# Patient Record
Sex: FEMALE | Race: BLACK OR AFRICAN AMERICAN | Employment: OTHER | URBAN - NONMETROPOLITAN AREA
[De-identification: names, ages, dates, MRNs, and addresses within clinical notes are randomized per-mention and may not be internally consistent; named-entity substitution may affect disease eponyms.]

---

## 2017-07-24 LAB
ANION GAP SERPL CALCULATED.3IONS-SCNC: 6 MMOL/L (ref 4–12)
BUN BLDV-MCNC: 29 MG/DL (ref 7–20)
CALCIUM SERPL-MCNC: 9.5 MG/DL (ref 8.8–10.5)
CHLORIDE BLD-SCNC: 103 MEQ/L (ref 101–111)
CO2: 25 MEQ/L (ref 21–32)
CREAT SERPL-MCNC: 1.47 MG/DL (ref 0.6–1.3)
CREATININE CLEARANCE: 42
GLUCOSE: 247 MG/DL (ref 70–110)
PARATHYROID HORMONE INTACT: 36.1 U/ML (ref 12–88)
PHOSPHORUS: 2.9 MG/DL (ref 2.4–4.7)
POTASSIUM SERPL-SCNC: 5.1 MEQ/L (ref 3.6–5)
SODIUM BLD-SCNC: 134 MEQ/L (ref 135–145)
VITAMIN D 25-HYDROXY: 29.38 NG/ML (ref 30–100)

## 2017-07-25 ENCOUNTER — TELEPHONE (OUTPATIENT)
Dept: NEPHROLOGY | Age: 72
End: 2017-07-25

## 2017-07-25 DIAGNOSIS — E87.5 HYPERKALEMIA: Primary | ICD-10-CM

## 2017-07-31 LAB — POTASSIUM SERPL-SCNC: 4.8 MEQ/L (ref 3.6–5)

## 2017-08-01 ENCOUNTER — OFFICE VISIT (OUTPATIENT)
Dept: NEPHROLOGY | Age: 72
End: 2017-08-01
Payer: MEDICARE

## 2017-08-01 ENCOUNTER — TELEPHONE (OUTPATIENT)
Dept: NEPHROLOGY | Age: 72
End: 2017-08-01

## 2017-08-01 VITALS
RESPIRATION RATE: 18 BRPM | BODY MASS INDEX: 23.57 KG/M2 | DIASTOLIC BLOOD PRESSURE: 62 MMHG | SYSTOLIC BLOOD PRESSURE: 170 MMHG | HEART RATE: 68 BPM | WEIGHT: 146 LBS

## 2017-08-01 DIAGNOSIS — E11.22 TYPE 2 DIABETES MELLITUS WITH STAGE 3 CHRONIC KIDNEY DISEASE, UNSPECIFIED LONG TERM INSULIN USE STATUS: ICD-10-CM

## 2017-08-01 DIAGNOSIS — I10 ESSENTIAL HYPERTENSION: ICD-10-CM

## 2017-08-01 DIAGNOSIS — N18.3 TYPE 2 DIABETES MELLITUS WITH STAGE 3 CHRONIC KIDNEY DISEASE, UNSPECIFIED LONG TERM INSULIN USE STATUS: ICD-10-CM

## 2017-08-01 DIAGNOSIS — E55.9 VITAMIN D DEFICIENCY: ICD-10-CM

## 2017-08-01 DIAGNOSIS — N18.30 CKD (CHRONIC KIDNEY DISEASE), STAGE III (HCC): Primary | ICD-10-CM

## 2017-08-01 PROCEDURE — 99213 OFFICE O/P EST LOW 20 MIN: CPT | Performed by: INTERNAL MEDICINE

## 2017-08-01 RX ORDER — MULTIVIT-MIN/IRON/FOLIC ACID/K 18-600-40
2000 CAPSULE ORAL DAILY
COMMUNITY
Start: 2017-08-01 | End: 2020-08-04 | Stop reason: ALTCHOICE

## 2018-07-25 LAB
ANION GAP SERPL CALCULATED.3IONS-SCNC: 6 MMOL/L (ref 4–12)
BUN BLDV-MCNC: 36 MG/DL (ref 7–20)
CALCIUM SERPL-MCNC: 10 MG/DL (ref 8.8–10.5)
CHLORIDE BLD-SCNC: 103 MEQ/L (ref 101–111)
CO2: 26 MEQ/L (ref 21–32)
CREAT SERPL-MCNC: 1.75 MG/DL (ref 0.6–1.3)
CREATININE CLEARANCE: 35
GLUCOSE: 282 MG/DL (ref 70–110)
PARATHYROID HORMONE INTACT: 29.4 U/ML (ref 12–88)
PHOSPHORUS: 3.4 MG/DL (ref 2.4–4.7)
POTASSIUM SERPL-SCNC: 5 MEQ/L (ref 3.6–5)
SODIUM BLD-SCNC: 135 MEQ/L (ref 135–145)

## 2018-07-31 LAB — VITAMIN D 1,25-DIHYDROXY: 44 PG/ML (ref 18–78)

## 2018-08-07 ENCOUNTER — OFFICE VISIT (OUTPATIENT)
Dept: NEPHROLOGY | Age: 73
End: 2018-08-07
Payer: MEDICARE

## 2018-08-07 VITALS
RESPIRATION RATE: 18 BRPM | HEART RATE: 80 BPM | DIASTOLIC BLOOD PRESSURE: 48 MMHG | BODY MASS INDEX: 24.21 KG/M2 | SYSTOLIC BLOOD PRESSURE: 130 MMHG | WEIGHT: 150 LBS

## 2018-08-07 DIAGNOSIS — N18.3 TYPE 2 DIABETES MELLITUS WITH STAGE 3 CHRONIC KIDNEY DISEASE, UNSPECIFIED WHETHER LONG TERM INSULIN USE: ICD-10-CM

## 2018-08-07 DIAGNOSIS — N18.30 CKD (CHRONIC KIDNEY DISEASE), STAGE III (HCC): Primary | ICD-10-CM

## 2018-08-07 DIAGNOSIS — E11.22 TYPE 2 DIABETES MELLITUS WITH STAGE 3 CHRONIC KIDNEY DISEASE, UNSPECIFIED WHETHER LONG TERM INSULIN USE: ICD-10-CM

## 2018-08-07 DIAGNOSIS — I10 ESSENTIAL HYPERTENSION: ICD-10-CM

## 2018-08-07 PROCEDURE — 99213 OFFICE O/P EST LOW 20 MIN: CPT | Performed by: INTERNAL MEDICINE

## 2018-08-07 RX ORDER — ATORVASTATIN CALCIUM 40 MG/1
40 TABLET, FILM COATED ORAL DAILY
COMMUNITY
End: 2022-06-16 | Stop reason: ALTCHOICE

## 2018-08-07 NOTE — PROGRESS NOTES
Renal Progress Note    Assessment and Plan:      Diagnosis Orders   1. CKD (chronic kidney disease), stage III     2. Essential hypertension     3. Type 2 diabetes mellitus with stage 3 chronic kidney disease, unspecified whether long term insulin use ([facility]       PLAN:  Labs reviewed with the patient and she understood  Serum creatinine is mostly stable at 1.75 mg/dL  Medications reviewed  No changes  Return visit in 12 months with labs          Patient Active Problem List   Diagnosis    Acute renal failure (HCC)    CKD (chronic kidney disease), stage III    HTN (hypertension)    Hypothyroidism    HLD (hyperlipidemia)    Anemia    DM type 2 (diabetes mellitus, type 2) (HCC)    Vitamin D deficiency       Subjective:   Chief complaint:  Chief Complaint   Patient presents with    1 Year Follow Up    Chronic Kidney Disease     Stage 3      HPI:This is a follow up visit for Mrs. Neema Lubin who is here today for return appointment. I see her for chronic kidney disease. She was last seen about 12 months ago. Serum creatinine was 1.47 mg/dL. Today it is 1.75 mg/dl but still within her baseline. Since last time I saw her, she had changes in her medication. Tradjenta dose was increased to 5 mg from 2.5 mg a day. Doing well with no complaints.     ROS:Constitutional: negative  Eyes: negative  Ears, nose, mouth, throat, and face: negative  Respiratory: negative  Cardiovascular: negative  Gastrointestinal: negative  Genitourinary:negative  Integument/breast: negative  Hematologic/lymphatic: negative  Musculoskeletal:positive for arthralgias and stiff joints  Neurological: negative  Behavioral/Psych: negative  Endocrine: negative  Allergic/Immunologic: negative  Medications:     Current Outpatient Prescriptions   Medication Sig Dispense Refill    atorvastatin (LIPITOR) 40 MG tablet Take 40 mg by mouth daily      Cholecalciferol (VITAMIN D) 2000 units CAPS capsule Take 2,000 Units by mouth daily      linagliptin (TRADJENTA) 5 MG tablet Take 5 mg by mouth daily      Multiple Vitamins-Minerals (CENTRUM SILVER) TABS Take  by mouth daily.  glimepiride (AMARYL) 4 MG tablet Take 4 mg by mouth every morning (before breakfast)       Levothyroxine Sodium 100 MCG CAPS Take 88 mcg by mouth Daily       losartan (COZAAR) 100 MG tablet Take 100 mg by mouth daily.  aspirin 81 MG tablet Take 81 mg by mouth daily.  pravastatin (PRAVACHOL) 40 MG tablet Take 40 mg by mouth daily.  amLODIPine (NORVASC) 5 MG tablet Take 5 mg by mouth daily. No current facility-administered medications for this visit.         Lab Results:    CBC:   Lab Results   Component Value Date    WBC 3.5 (L) 06/25/2015    HGB 11.2 (L) 06/25/2015    HCT 35.2 06/25/2015    MCV 84.58 06/25/2015     06/25/2015     BMP:    Lab Results   Component Value Date     07/25/2018     07/25/2018     (L) 07/24/2017    K 5.0 07/25/2018    K 5.0 07/25/2018    K 4.8 07/31/2017     07/25/2018     07/25/2018     07/24/2017    CO2 26 07/25/2018    CO2 26 07/25/2018    CO2 25 07/24/2017    BUN 36 (H) 07/25/2018    BUN 36 (H) 07/25/2018    BUN 29 (H) 07/24/2017    CREATININE 1.75 (H) 07/25/2018    CREATININE 1.75 (H) 07/25/2018    CREATININE 1.47 (H) 07/24/2017    GLUCOSE 282 (H) 07/25/2018    GLUCOSE 282 (H) 07/25/2018    GLUCOSE 247 (H) 07/24/2017      Hepatic:   Lab Results   Component Value Date    AST 37 07/25/2018    AST 30 06/25/2015    ALT 24 07/25/2018    ALT 18 06/25/2015    BILITOT 0.8 07/25/2018    BILITOT 0.6 06/25/2015    ALKPHOS 74 07/25/2018    ALKPHOS 64 06/25/2015     BNP: No results found for: BNP  Lipids:   Lab Results   Component Value Date    CHOL 233 (H) 07/25/2018    HDL 84 07/25/2018     INR: No results found for: INR  URINE: No results found for: NAUR, PROTUR  No results found for: NITRU, COLORU, PHUR, LABCAST, WBCUA, RBCUA, MUCUS, TRICHOMONAS, YEAST, BACTERIA, CLARITYU, SPECGRAV, LEUKOCYTESUR, UROBILINOGEN, BILIRUBINUR, BLOODU, GLUCOSEU, KETUA, AMORPHOUS   Microalbumen/Creatinine ratio:  No components found for: RUCREAT    Objective:   Vitals: BP (!) 130/48   Pulse 80   Resp 18   Wt 150 lb (68 kg)   BMI 24.21 kg/m²      Constitutional:  Alert, awake, no apparent distress  Skin:normal  HEENT:Pupils are reactive . Throat is clear  Neck:supple with no thyromegally  Cardiovascular:  S1, S2 without murmur  Respiratory:  Clear  Abdomen: +bs, soft, non tender  Ext: No LE edema  Musculoskeletal:Intact  Neuro:Alert and oriented with no deficit    Electronically signed by Jaimie Cardoso MD on 8/7/2018 at 10:11 AM

## 2018-09-28 PROBLEM — N18.30 TYPE 2 DIABETES MELLITUS WITH STAGE 3 CHRONIC KIDNEY DISEASE (HCC): Status: ACTIVE | Noted: 2018-09-28

## 2018-09-28 PROBLEM — E11.22 TYPE 2 DIABETES MELLITUS WITH STAGE 3 CHRONIC KIDNEY DISEASE (HCC): Status: ACTIVE | Noted: 2018-09-28

## 2019-07-26 LAB
ANION GAP SERPL CALCULATED.3IONS-SCNC: 6 MMOL/L (ref 4–12)
BUN BLDV-MCNC: 38 MG/DL (ref 7–20)
CALCIUM SERPL-MCNC: 9.9 MG/DL (ref 8.8–10.5)
CHLORIDE BLD-SCNC: 102 MEQ/L (ref 101–111)
CO2: 27 MEQ/L (ref 21–32)
CREAT SERPL-MCNC: 2.1 MG/DL (ref 0.6–1.3)
CREATININE CLEARANCE: 28
GLUCOSE: 263 MG/DL (ref 70–110)
PARATHYROID HORMONE INTACT: 39 U/ML (ref 12–88)
PHOSPHORUS: 3.4 MG/DL (ref 2.4–4.7)
POTASSIUM SERPL-SCNC: 5.2 MEQ/L (ref 3.6–5)
SODIUM BLD-SCNC: 135 MEQ/L (ref 135–145)
VITAMIN D 25-HYDROXY: 26.3 NG/ML (ref 30–100)

## 2019-07-29 ENCOUNTER — TELEPHONE (OUTPATIENT)
Dept: NEPHROLOGY | Age: 74
End: 2019-07-29

## 2019-07-29 NOTE — TELEPHONE ENCOUNTER
----- Message from Renata Doran MD sent at 7/29/2019  5:34 AM EDT -----  Serum potassium is slightly high.   Low potassium diet   Repeat potassium in 3 days

## 2019-08-06 ENCOUNTER — OFFICE VISIT (OUTPATIENT)
Dept: NEPHROLOGY | Age: 74
End: 2019-08-06
Payer: MEDICARE

## 2019-08-06 VITALS
DIASTOLIC BLOOD PRESSURE: 69 MMHG | SYSTOLIC BLOOD PRESSURE: 161 MMHG | OXYGEN SATURATION: 96 % | HEART RATE: 60 BPM | BODY MASS INDEX: 24.02 KG/M2 | WEIGHT: 148.8 LBS

## 2019-08-06 DIAGNOSIS — E11.22 TYPE 2 DIABETES MELLITUS WITH STAGE 3 CHRONIC KIDNEY DISEASE, UNSPECIFIED WHETHER LONG TERM INSULIN USE: ICD-10-CM

## 2019-08-06 DIAGNOSIS — E55.9 VITAMIN D DEFICIENCY: ICD-10-CM

## 2019-08-06 DIAGNOSIS — N18.30 CKD (CHRONIC KIDNEY DISEASE), STAGE III (HCC): Primary | ICD-10-CM

## 2019-08-06 DIAGNOSIS — E87.5 HYPERKALEMIA: ICD-10-CM

## 2019-08-06 DIAGNOSIS — I10 ESSENTIAL HYPERTENSION: ICD-10-CM

## 2019-08-06 DIAGNOSIS — N18.3 TYPE 2 DIABETES MELLITUS WITH STAGE 3 CHRONIC KIDNEY DISEASE, UNSPECIFIED WHETHER LONG TERM INSULIN USE: ICD-10-CM

## 2019-08-06 PROCEDURE — 99214 OFFICE O/P EST MOD 30 MIN: CPT | Performed by: INTERNAL MEDICINE

## 2019-08-06 RX ORDER — PIOGLITAZONEHYDROCHLORIDE 15 MG/1
15 TABLET ORAL DAILY
COMMUNITY
End: 2021-08-17

## 2019-08-06 NOTE — PATIENT INSTRUCTIONS
Plums, dried, pitted five 350   Artichokes, cooked one medium 343   Mashed potatoes ½ C 343   Edamame/soybeans, green ½ C 338   Tomato, canned  ½ C 325   Raisins ¼ C 299   Tomato, raw one medium 292   Papaya one small 286   Peach one medium 285   Pistachios, dry roasted, salted  1 oz (47 nuts) 285   Pumpkin, cooked, mashed ½ C 282   French fries 10 fries 278   Mushrooms, white, cooked ½ C 278   Beets, cooked ½ C 259   [place] sprouts, cooked ½ C 247   Kiwi one medium 237   Orange, raw one medium 237   Green peas, cooked ½ C 217   Cantaloupe, raw ½ C 214   Pear, raw one medium 212   Almonds, dry roasted 1 oz (24 nuts) 202   Apricot, canned, juice pack ½ C 202   Asparagus, cooked ½ C 202   Swanzey squash, cooked ½ C 201   Apple, raw with skin one medium 195   Honeydew ½ C 194   Carrots, cooked ½ C 183   Onions, cooked ½ C 175   Spinach, raw 1 C 167   Corn, sweet yellow ½ C 163   Red guthrie pepper ½ C 157   Kale, cooked ½ C 150   Cabbage, cooked ½ C 147   Mustard greens, cooked ½ C 142   [place] ½ C 139   Figs, dried two figs 134   Summer squash, cooked ½ C 126   Grapes 10 grapes 120   Okra, cooked ½ C 108   Bamboo shoots, canned 1 C 108   Celery, raw one stalk 105   Peanut butter, creamy 1 Tbsp 104   Green beans, cooked ½ C 104   Cauliflower, cooked ½ C 91   Mushrooms, shitake, cooked ½ C 88   Watermelon ½ C 85   Cucumber, peeled ½ C 88   Iceberg lettuce 1 C 81   Tomato, sun dried one piece 80   Eggplant, cooked ½ C 69   Pickle one pickle 61   Ketchup 1 Tbsp 60   Radishes one radish 57     5   C=cup, mg=milligrams, oz=ounces, Tbsp=tablespoon    Reference  [place] Dept of Agriculture, Agricultural Research Service, [employer] Database for Standard Reference, Release 25: Potassium, K (mg) content of selected foods per common measure  Please take vitamin D3 over-the-counter 2000 international units 1 capsule a day for low vitamin D level.

## 2019-08-16 LAB
BUN BLDV-MCNC: 67 MG/DL
CALCIUM SERPL-MCNC: 9.9 MG/DL
CHLORIDE BLD-SCNC: 103 MMOL/L
CO2: 22 MMOL/L
CREAT SERPL-MCNC: 2.41 MG/DL
GFR CALCULATED: 24
GLUCOSE BLD-MCNC: 90 MG/DL
POTASSIUM SERPL-SCNC: 5.5 MMOL/L
POTASSIUM SERPL-SCNC: 6.1 MEQ/L (ref 3.6–5)
SODIUM BLD-SCNC: 133 MMOL/L

## 2019-08-19 ENCOUNTER — TELEPHONE (OUTPATIENT)
Dept: NEPHROLOGY | Age: 74
End: 2019-08-19

## 2019-08-19 DIAGNOSIS — E87.5 HYPERKALEMIA: Primary | ICD-10-CM

## 2019-08-23 ENCOUNTER — TELEPHONE (OUTPATIENT)
Dept: NEPHROLOGY | Age: 74
End: 2019-08-23

## 2019-08-23 LAB — POTASSIUM SERPL-SCNC: 5 MEQ/L (ref 3.6–5)

## 2020-01-28 LAB
ANION GAP SERPL CALCULATED.3IONS-SCNC: 6 MMOL/L (ref 4–12)
BUN BLDV-MCNC: 50 MG/DL (ref 7–20)
CALCIUM SERPL-MCNC: 9.5 MG/DL (ref 8.8–10.5)
CHLORIDE BLD-SCNC: 107 MEQ/L (ref 101–111)
CO2: 22 MEQ/L (ref 21–32)
CREAT SERPL-MCNC: 2.25 MG/DL (ref 0.6–1.3)
CREATININE CLEARANCE: 26
CREATININE, RANDOM URINE: 92.6 MG/DL
GLUCOSE: 174 MG/DL (ref 70–110)
PARATHYROID HORMONE INTACT: 30.4 U/ML (ref 12–88)
PHOSPHORUS: 3.4 MG/DL (ref 2.4–4.7)
POTASSIUM SERPL-SCNC: 4.6 MEQ/L (ref 3.6–5)
PROTEIN, URINE, RANDOM: 71.2 MG/DL
PROTEIN/CREAT RATIO: 0.8 G/1.73M2
SODIUM BLD-SCNC: 135 MEQ/L (ref 135–145)
VITAMIN D 25-HYDROXY: 33.3 NG/ML (ref 30–100)

## 2020-02-04 ENCOUNTER — OFFICE VISIT (OUTPATIENT)
Dept: NEPHROLOGY | Age: 75
End: 2020-02-04
Payer: MEDICARE

## 2020-02-04 VITALS
OXYGEN SATURATION: 99 % | BODY MASS INDEX: 25.11 KG/M2 | WEIGHT: 155.6 LBS | SYSTOLIC BLOOD PRESSURE: 160 MMHG | HEART RATE: 77 BPM | DIASTOLIC BLOOD PRESSURE: 74 MMHG

## 2020-02-04 PROCEDURE — 99213 OFFICE O/P EST LOW 20 MIN: CPT | Performed by: INTERNAL MEDICINE

## 2020-02-04 RX ORDER — LEVOTHYROXINE SODIUM 0.12 MG/1
112 TABLET ORAL DAILY
COMMUNITY
End: 2022-05-19

## 2020-02-04 NOTE — PATIENT INSTRUCTIONS
Please check your blood pressure twice a day mornings and evenings for at least 5 to 7 days before the next appointment and bring the record.

## 2020-02-04 NOTE — PROGRESS NOTES
Results   Component Value Date    AST 37 07/25/2018    AST 30 06/25/2015    ALT 24 07/25/2018    ALT 18 06/25/2015    BILITOT 0.8 07/25/2018    BILITOT 0.6 06/25/2015    ALKPHOS 74 07/25/2018    ALKPHOS 64 06/25/2015     BNP: No results found for: BNP  Lipids:   Lab Results   Component Value Date    CHOL 233 (H) 07/25/2018    HDL 84 07/25/2018     INR: No results found for: INR  URINE: No results found for: NAUR, PROTUR  No results found for: NITRU, COLORU, PHUR, LABCAST, WBCUA, RBCUA, MUCUS, TRICHOMONAS, YEAST, BACTERIA, CLARITYU, SPECGRAV, LEUKOCYTESUR, UROBILINOGEN, BILIRUBINUR, BLOODU, GLUCOSEU, KETUA, AMORPHOUS   Microalbumen/Creatinine ratio:  No components found for: RUCREAT    Objective:   Vitals: BP (!) 160/74 (Site: Right Upper Arm, Position: Sitting, Cuff Size: Medium Adult)   Pulse 77   Wt 155 lb 9.6 oz (70.6 kg)   SpO2 99%   BMI 25.11 kg/m²      Constitutional:  Alert, awake, no apparent distress  Skin:normal with no rash or any lesions  HEENT:Pupils are reactive . Throat is clear. Oral mucosa is moist.  Neck:supple with no thyromegaly or bruit   Cardiovascular:  S1, S2 without murmur   Respiratory:  Clear to auscultation with no wheezes or rales  Abdomen: +bowel sound, soft, non tender and no bruit  Ext: No.  LE edema  Musculoskeletal:Intact  Neuro:Alert, awake and oriented with no obvious focal deficit.   Speech is normal.    Electronically signed by Dallis Lesches, MD on 2/4/2020 at 9:03 AM

## 2020-07-29 LAB
ANION GAP SERPL CALCULATED.3IONS-SCNC: 5 MMOL/L (ref 4–12)
BUN BLDV-MCNC: 57 MG/DL (ref 7–20)
CALCIUM SERPL-MCNC: 9.7 MG/DL (ref 8.8–10.5)
CHLORIDE BLD-SCNC: 108 MEQ/L (ref 101–111)
CO2: 21 MEQ/L (ref 21–32)
CREAT SERPL-MCNC: 2.33 MG/DL (ref 0.6–1.3)
CREATININE CLEARANCE: 25
CREATININE, RANDOM URINE: 36 MG/DL
GLUCOSE: 183 MG/DL (ref 70–110)
PARATHYROID HORMONE INTACT: 41.6 U/ML (ref 12–88)
POTASSIUM SERPL-SCNC: 4.2 MEQ/L (ref 3.6–5)
PROTEIN, URINE, RANDOM: 35.3 MG/DL
PROTEIN/CREAT RATIO: 1 G/1.73M2
SODIUM BLD-SCNC: 134 MEQ/L (ref 135–145)
VITAMIN D 25-HYDROXY: 30.8 NG/ML (ref 30–100)

## 2020-08-04 ENCOUNTER — OFFICE VISIT (OUTPATIENT)
Dept: NEPHROLOGY | Age: 75
End: 2020-08-04
Payer: MEDICARE

## 2020-08-04 VITALS
DIASTOLIC BLOOD PRESSURE: 62 MMHG | WEIGHT: 156 LBS | TEMPERATURE: 97.4 F | BODY MASS INDEX: 25.07 KG/M2 | OXYGEN SATURATION: 100 % | HEART RATE: 73 BPM | SYSTOLIC BLOOD PRESSURE: 164 MMHG | HEIGHT: 66 IN

## 2020-08-04 PROCEDURE — 99213 OFFICE O/P EST LOW 20 MIN: CPT | Performed by: INTERNAL MEDICINE

## 2020-08-04 NOTE — PROGRESS NOTES
Renal Progress Note    Assessment and Plan:      Diagnosis Orders   1. CKD (chronic kidney disease), stage III ([facility]     2. Type 2 diabetes mellitus with stage 3 chronic kidney disease, unspecified whether long term insulin use ([facility]     3. Essential hypertension     4. Proteinuria, unspecified type       PLAN:  Lab result discussed with the patient. She understood. Serum creatinine is very slightly increased to 2.3 mg/dL from 2.2 mg/dL 6 months ago. Medications reviewed. No changes. Continue to avoid nonsteroidal anti-inflammatory drugs. Return visit in 6 months with labs. Patient Active Problem List   Diagnosis    Acute renal failure (HCC)    CKD (chronic kidney disease), stage III ([facility]    HTN (hypertension)    Hypothyroidism    HLD (hyperlipidemia)    Anemia    DM type 2 (diabetes mellitus, type 2) (HCC)    Vitamin D deficiency    Type 2 diabetes mellitus with stage 3 chronic kidney disease ([facility]       Subjective:   Chief complaint:  Chief Complaint   Patient presents with    Chronic Kidney Disease      HPI:This is a follow up visit for Mrs. Thom Bradley who is here today for repeat appointment. I see her for chronic kidney disease. She was last seen about 6 months ago. Serum creatinine was 2.25 mg/L. Today it is 2.32 g/dL. Doing well with no complaint. She brought her home blood pressure record. They are very good. Her Tradjenta was discontinued and replaced with onglyyza . Joelle Vasquez Denies chest pain,shortness of breath,fever ,chils ,nausea and vomiting     ROS:Constitutional: negative  Eyes: negative  Ears, nose, mouth, throat, and face: negative  Respiratory: negative  Cardiovascular: negative  Gastrointestinal: negative  Genitourinary:negative  Integument/breast: negative  Hematologic/lymphatic: negative  Musculoskeletal:negative  Neurological: negative  Behavioral/Psych: negative  Endocrine: negative  Allergic/Immunologic: negative     Medications:     Current Outpatient Medications Medication Sig Dispense Refill    saxagliptin (ONGLYZA) 2.5 MG TABS tablet Take 2.5 mg by mouth daily      levothyroxine (SYNTHROID) 125 MCG tablet Take 125 mcg by mouth Daily      pioglitazone (ACTOS) 15 MG tablet Take 15 mg by mouth daily      atorvastatin (LIPITOR) 40 MG tablet Take 40 mg by mouth daily      Multiple Vitamins-Minerals (CENTRUM SILVER) TABS Take  by mouth daily.  glimepiride (AMARYL) 4 MG tablet Take 4 mg by mouth every morning (before breakfast)       losartan (COZAAR) 100 MG tablet Take 100 mg by mouth daily.  aspirin 81 MG tablet Take 81 mg by mouth daily.  amLODIPine (NORVASC) 5 MG tablet Take 5 mg by mouth daily. No current facility-administered medications for this visit.         Lab Results:    CBC:   Lab Results   Component Value Date    WBC 3.5 (L) 06/25/2015    HGB 11.2 (L) 06/25/2015    HCT 35.2 06/25/2015    MCV 84.58 06/25/2015     06/25/2015     BMP:    Lab Results   Component Value Date     (L) 07/29/2020     01/28/2020     08/16/2019    K 4.2 07/29/2020    K 4.6 01/28/2020    K 5.0 08/23/2019     07/29/2020     01/28/2020     08/16/2019    CO2 21 07/29/2020    CO2 22 01/28/2020    CO2 22 08/16/2019    BUN 57 (H) 07/29/2020    BUN 50 (H) 01/28/2020    BUN 67 08/16/2019    CREATININE 2.33 (H) 07/29/2020    CREATININE 2.25 (H) 01/28/2020    CREATININE 2.41 08/16/2019    GLUCOSE 183 (H) 07/29/2020    GLUCOSE 174 (H) 01/28/2020    GLUCOSE 90 08/16/2019      Hepatic:   Lab Results   Component Value Date    AST 37 07/25/2018    AST 30 06/25/2015    ALT 24 07/25/2018    ALT 18 06/25/2015    BILITOT 0.8 07/25/2018    BILITOT 0.6 06/25/2015    ALKPHOS 74 07/25/2018    ALKPHOS 64 06/25/2015     BNP: No results found for: BNP  Lipids:   Lab Results   Component Value Date    CHOL 233 (H) 07/25/2018    HDL 84 07/25/2018     INR: No results found for: INR  URINE: No results found for: NAUR, PROTUR  No results found for: NITRU,

## 2021-02-03 LAB
ANION GAP SERPL CALCULATED.3IONS-SCNC: 5 MMOL/L (ref 4–12)
BUN BLDV-MCNC: 47 MG/DL (ref 7–20)
CALCIUM SERPL-MCNC: 9.6 MG/DL (ref 8.8–10.5)
CHLORIDE BLD-SCNC: 101 MEQ/L (ref 101–111)
CO2: 26 MEQ/L (ref 21–32)
CREAT SERPL-MCNC: 2.02 MG/DL (ref 0.6–1.3)
CREATININE CLEARANCE: 29
CREATININE, RANDOM URINE: 74.2 MG/DL
GLUCOSE: 237 MG/DL (ref 70–110)
PARATHYROID HORMONE INTACT: 40.5 U/ML (ref 12–88)
POTASSIUM SERPL-SCNC: 4.6 MEQ/L (ref 3.6–5)
PROTEIN, URINE, RANDOM: 90.7 MG/DL
PROTEIN/CREAT RATIO: 1.2 G/1.73M2
SODIUM BLD-SCNC: 132 MEQ/L (ref 135–145)
VITAMIN D 25-HYDROXY: 30.4 NG/ML (ref 30–100)

## 2021-02-16 ENCOUNTER — VIRTUAL VISIT (OUTPATIENT)
Dept: NEPHROLOGY | Age: 76
End: 2021-02-16
Payer: MEDICARE

## 2021-02-16 DIAGNOSIS — R80.9 PROTEINURIA, UNSPECIFIED TYPE: ICD-10-CM

## 2021-02-16 DIAGNOSIS — I10 ESSENTIAL HYPERTENSION: ICD-10-CM

## 2021-02-16 DIAGNOSIS — N18.32 STAGE 3B CHRONIC KIDNEY DISEASE (HCC): Primary | ICD-10-CM

## 2021-02-16 DIAGNOSIS — E55.9 VITAMIN D DEFICIENCY: ICD-10-CM

## 2021-02-16 DIAGNOSIS — E87.1 HYPONATREMIA: ICD-10-CM

## 2021-02-16 PROCEDURE — 99213 OFFICE O/P EST LOW 20 MIN: CPT | Performed by: INTERNAL MEDICINE

## 2021-02-16 NOTE — PROGRESS NOTES
2021    TELEHEALTH EVALUATION -- Audio/Visual (During ESHBQ-22 public health emergency)  This is a telehealth video visit for Mrs. Devan Loera. I see her for chronic kidney disease. She was last seen about 6 months ago. Doing well with no complaint. She does not measure her blood pressure routinely at home but only very occasionally. When she draws blood pressure tends to be good. No chest pain or shortness of breath. No fever or chills. Appetite is good. No nausea vomiting. No headaches. She has chronic joint ache and pain modified by analgesics. No new medications since last time I saw her in the office  Place of visit for patient is her home  Place of visit for physician is office  Reason for visit is follow-up for chronic kidney disease  HPI: As above    Devan Loera (:  1945) has requested an audio/video evaluation for the following concern(s):    Follow-up for chronic kidney disease    Review of Systems as in history of present illness. Other systems are negative. Prior to Visit Medications    Medication Sig Taking? Authorizing Provider   saxagliptin (ONGLYZA) 2.5 MG TABS tablet Take 2.5 mg by mouth daily  Historical Provider, MD   levothyroxine (SYNTHROID) 125 MCG tablet Take 125 mcg by mouth Daily  Historical Provider, MD   pioglitazone (ACTOS) 15 MG tablet Take 15 mg by mouth daily  Historical Provider, MD   atorvastatin (LIPITOR) 40 MG tablet Take 40 mg by mouth daily  Historical Provider, MD   Multiple Vitamins-Minerals (CENTRUM SILVER) TABS Take  by mouth daily. Historical Provider, MD   glimepiride (AMARYL) 4 MG tablet Take 4 mg by mouth every morning (before breakfast)   Historical Provider, MD   losartan (COZAAR) 100 MG tablet Take 100 mg by mouth daily. Historical Provider, MD   aspirin 81 MG tablet Take 81 mg by mouth daily. Historical Provider, MD   amLODIPine (NORVASC) 5 MG tablet Take 5 mg by mouth daily.   Historical Provider, MD       Social History Tobacco Use    Smoking status: Never Smoker    Smokeless tobacco: Never Used   Substance Use Topics    Alcohol use: No    Drug use: Not on file            PHYSICAL EXAMINATION:  [ INSTRUCTIONS:  \"[x]\" Indicates a positive item  \"[]\" Indicates a negative item  -- DELETE ALL ITEMS NOT EXAMINED]  Vital Signs: (As obtained by patient/caregiver or practitioner observation)    Blood pressure-  Heart rate-    Respiratory rate-    Temperature-  Pulse oximetry-     Constitutional: [x] Appears well-developed and well-nourished [x] No apparent distress      [] Abnormal-   Mental status  [x] Alert and awake  [x] Oriented to person/place/time [x]Able to follow commands      Eyes:  EOM    [x]  Normal  [] Abnormal-  Sclera  [x]  Normal  [] Abnormal -         Discharge [x]  None visible  [] Abnormal -    HENT:   [x] Normocephalic, atraumatic. [] Abnormal   [x] Mouth/Throat: Mucous membranes are moist.     External Ears [x] Normal  [] Abnormal-     Neck: [x] No visualized mass     Pulmonary/Chest: [x] Respiratory effort normal.  [x] No visualized signs of difficulty breathing or respiratory distress        [] Abnormal-      Musculoskeletal:   [] Normal gait with no signs of ataxia         [] Normal range of motion of neck        [] Abnormal-       Neurological:        [x] No Facial Asymmetry (Cranial nerve 7 motor function) (limited exam to video visit)          [x] No gaze palsy        [] Abnormal-         Skin:        [x] No significant exanthematous lesions or discoloration noted on facial skin         [] Abnormal-            Psychiatric:       [x] Normal Affect [x] No Hallucinations        [] Abnormal-     Other pertinent observable physical exam findings-     ASSESSMENT/PLAN:   Diagnosis Orders   1. Stage 3b chronic kidney disease     2. Essential hypertension     3. Proteinuria, unspecified type     4. Vitamin D deficiency     5. Hyponatremia        PLAN:  Lab result discussed with the patient. She understood. I addressed her questions. Serum creatinine is improved to 2.0 mg/dL from 2.3 mg/dL 6 months ago. PTH is normal.  Vitamin D level is on the low end of normal.  Random urine protein creatinine ratio is slightly increased to 1200 mg from 1000 mg 6 months ago. Low protein diet. Medications reviewed. No changes. Return visit in 6 months with labs. No follow-ups on file. Casimiro Chandler is a 77 y.o. female being evaluated by a Virtual Visit (video visit) encounter to address concerns as mentioned above. A caregiver was present when appropriate. Due to this being a TeleHealth encounter (During YLNSI-72 public [facility] emergency), evaluation of the following organ systems was limited: Vitals/Constitutional/EENT/Resp/CV/GI//MS/Neuro/Skin/Heme-Lymph-Imm. Pursuant to the emergency declaration under the [address] [place] [address], [address] [place] [address] authority and the [employer] Act, this Virtual Visit was conducted with patient's (and/or legal guardian's) consent, to reduce the patient's risk of exposure to COVID-19 and provide necessary medical care. The patient (and/or legal guardian) has also been advised to contact this office for worsening conditions or problems, and seek emergency medical treatment and/or call 911 if deemed necessary. Patient identification was verified at the start of the visit: Yes    Total time spent on this encounter: About 14 minutes including documentation time and chart review    Services were provided through a video synchronous discussion virtually to substitute for in-person clinic visit. Patient and provider were located at their individual homes. --Ortiz Toscano MD on 2/16/2021 at 8:31 AM    An electronic signature was used to authenticate this note.

## 2021-07-30 LAB
ANION GAP SERPL CALCULATED.3IONS-SCNC: 5 MMOL/L (ref 4–12)
BUN BLDV-MCNC: 45 MG/DL (ref 7–20)
CALCIUM SERPL-MCNC: 9.1 MG/DL (ref 8.8–10.5)
CHLORIDE BLD-SCNC: 108 MEQ/L (ref 101–111)
CO2: 20 MEQ/L (ref 21–32)
CREAT SERPL-MCNC: 2.09 MG/DL (ref 0.6–1.3)
CREATININE CLEARANCE: 28
CREATININE, RANDOM URINE: 138.3 MG/DL
GLUCOSE: 197 MG/DL (ref 70–110)
PARATHYROID HORMONE INTACT: 48.1 U/ML (ref 12–88)
POTASSIUM SERPL-SCNC: 5.4 MEQ/L (ref 3.6–5)
PROTEIN, URINE, RANDOM: 94.3 MG/DL
PROTEIN/CREAT RATIO: 0.68 G/1.73M2
SODIUM BLD-SCNC: 133 MEQ/L (ref 135–145)
VITAMIN D 25-HYDROXY: 25.6 NG/ML (ref 30–100)

## 2021-08-17 ENCOUNTER — OFFICE VISIT (OUTPATIENT)
Dept: NEPHROLOGY | Age: 76
End: 2021-08-17
Payer: MEDICARE

## 2021-08-17 VITALS
SYSTOLIC BLOOD PRESSURE: 158 MMHG | WEIGHT: 154.6 LBS | BODY MASS INDEX: 24.95 KG/M2 | DIASTOLIC BLOOD PRESSURE: 65 MMHG | HEART RATE: 58 BPM | OXYGEN SATURATION: 100 %

## 2021-08-17 DIAGNOSIS — I10 ESSENTIAL HYPERTENSION: ICD-10-CM

## 2021-08-17 DIAGNOSIS — E55.9 VITAMIN D DEFICIENCY: ICD-10-CM

## 2021-08-17 DIAGNOSIS — R80.9 PROTEINURIA, UNSPECIFIED TYPE: ICD-10-CM

## 2021-08-17 DIAGNOSIS — N18.32 STAGE 3B CHRONIC KIDNEY DISEASE (HCC): Primary | ICD-10-CM

## 2021-08-17 PROCEDURE — 99213 OFFICE O/P EST LOW 20 MIN: CPT | Performed by: INTERNAL MEDICINE

## 2021-08-17 RX ORDER — PIOGLITAZONEHYDROCHLORIDE 30 MG/1
30 TABLET ORAL DAILY
COMMUNITY

## 2021-08-17 NOTE — PATIENT INSTRUCTIONS
Please take vitamin D3 over-the-counter 2000 international unit 1 capsule a day for low vitamin D level.

## 2021-08-17 NOTE — PROGRESS NOTES
Renal Progress Note    Assessment and Plan:      Diagnosis Orders   1. Stage 3b chronic kidney disease ([facility]     2. Essential hypertension     3. Vitamin D deficiency     4. Proteinuria, unspecified type       PLAN:  I discussed my thoughts with the patient. .She understood. Lab results are reviewed with in epic  Serum creatinine stable at 2.09 mg/dL  PTH is normal.    vitamin D level slightly low at 25  Medications reviewed vitamin D3 over-the-counter 2000 international unit 1 capsule a day  Printed instruction given to the patient  Return visit in 6 months with labs      Patient Active Problem List   Diagnosis    Acute renal failure ([facility]    CKD (chronic kidney disease), stage III ([facility]    HTN (hypertension)    Hypothyroidism    HLD (hyperlipidemia)    Anemia    DM type 2 (diabetes mellitus, type 2) ([facility]    Vitamin D deficiency    Type 2 diabetes mellitus with stage 3 chronic kidney disease ([facility]           Subjective:   Chief complaint:  Chief Complaint   Patient presents with    Chronic Kidney Disease     Stage IV      HPI:This is a follow up visit forMs. Qasim Rome who is here today for return appointment. I see her for chronic kidney disease. She was last [facility] about 6 [facility]. Doing well since then. No new medications. She checks her blood pressure very occasionally and is in the normal range according to her. Denies any chest pain,shortness of breath,fever or chills    ROS:  Pertinent positives stated above in HPI. All other systems were reviewed and were negative.   Medications:     Current Outpatient Medications   Medication Sig Dispense Refill    pioglitazone (ACTOS) 30 MG tablet Take 30 mg by mouth daily      saxagliptin (ONGLYZA) 2.5 MG TABS tablet Take 2.5 mg by mouth daily      levothyroxine (SYNTHROID) 125 MCG tablet Take 112 mcg by mouth Daily       atorvastatin (LIPITOR) 40 MG tablet Take 40 mg by mouth daily      Multiple Vitamins-Minerals (CENTRUM SILVER) TABS Take  by mouth daily.  glimepiride (AMARYL) 4 MG tablet Take 4 mg by mouth every morning (before breakfast)       losartan (COZAAR) 100 MG tablet Take 100 mg by mouth daily.  aspirin 81 MG tablet Take 81 mg by mouth daily.  amLODIPine (NORVASC) 5 MG tablet Take 5 mg by mouth daily. No current facility-administered medications for this visit.        Lab Results:    CBC:   Lab Results   Component Value Date    WBC 3.5 (L) 06/25/2015    HGB 11.2 (L) 06/25/2015    HCT 35.2 06/25/2015    MCV 84.58 06/25/2015     06/25/2015     BMP:    Lab Results   Component Value Date     (L) 07/30/2021     (L) 02/03/2021     (L) 07/29/2020    K 5.4 (H) 07/30/2021    K 4.6 02/03/2021    K 4.2 07/29/2020     07/30/2021     02/03/2021     07/29/2020    CO2 20 (L) 07/30/2021    CO2 26 02/03/2021    CO2 21 07/29/2020    BUN 45 (H) 07/30/2021    BUN 47 (H) 02/03/2021    BUN 57 (H) 07/29/2020    CREATININE 2.09 (H) 07/30/2021    CREATININE 2.02 (H) 02/03/2021    CREATININE 2.33 (H) 07/29/2020    GLUCOSE 197 (H) 07/30/2021    GLUCOSE 237 (H) 02/03/2021    GLUCOSE 183 (H) 07/29/2020      Hepatic:   Lab Results   Component Value Date    AST 37 07/25/2018    AST 30 06/25/2015    ALT 24 07/25/2018    ALT 18 06/25/2015    BILITOT 0.8 07/25/2018    BILITOT 0.6 06/25/2015    ALKPHOS 74 07/25/2018    ALKPHOS 64 06/25/2015     BNP: No results found for: BNP  Lipids:   Lab Results   Component Value Date    CHOL 233 (H) 07/25/2018    HDL 84 07/25/2018     INR: No results found for: INR  URINE: No results found for: NAUR, PROTUR  No results found for: NITRU, COLORU, PHUR, LABCAST, WBCUA, RBCUA, MUCUS, TRICHOMONAS, YEAST, BACTERIA, CLARITYU, SPECGRAV, LEUKOCYTESUR, UROBILINOGEN, BILIRUBINUR, BLOODU, GLUCOSEU, KETUA, AMORPHOUS   Microalbumen/Creatinine ratio:  No components found for: RUCREAT    Objective:   Vitals: BP (!) 158/65 (Site: Left Upper Arm, Position: Sitting, Cuff Size: Large Adult)   Pulse 58   Wt 154 lb 9.6 oz (70.1 kg)   SpO2 100%   BMI 24.95 kg/m²      Constitutional:  Alert, awake, no apparent distress  Skin:normal with no rash or any lesions  HEENT:Pupils are reactive . Throat is clear. Oral mucosa is moist.  Neck:supple with no thyromegaly or bruit   Cardiovascular:  S1, S2 without murmur   Respiratory:  Clear to auscultation with no wheezes or rales  Abdomen: +bowel sound, soft, non tender and no bruit  Ext: No LE edema  Musculoskeletal:Intact  Neuro:Alert, awake and oriented with no obvious focal deficit. Speech is normal    Electronically signed by Ros Larsen MD on 8/17/2021 at 8:58 AM   **This report has been created using voice recognition software. It maycontain minor  errors which are inherent in voice recognition technology. **

## 2022-02-10 ENCOUNTER — TELEPHONE (OUTPATIENT)
Dept: NEPHROLOGY | Age: 77
End: 2022-02-10

## 2022-02-10 LAB
ANION GAP SERPL CALCULATED.3IONS-SCNC: 5 MMOL/L (ref 4–12)
BUN BLDV-MCNC: 58 MG/DL (ref 7–20)
CALCIUM SERPL-MCNC: 9.4 MG/DL (ref 8.8–10.5)
CHLORIDE BLD-SCNC: 107 MEQ/L (ref 101–111)
CO2: 23 MEQ/L (ref 21–32)
CREAT SERPL-MCNC: 2.51 MG/DL (ref 0.6–1.3)
CREATININE CLEARANCE: 23
FERRITIN: 181 NG/ML (ref 12–263)
GLUCOSE: 140 MG/DL (ref 70–110)
HCT VFR BLD CALC: 36.9 % (ref 35–44)
HEMOGLOBIN: 11.6 GM/DL (ref 12–15)
IRON SATURATION: 32 % (ref 15–50)
IRON, SERUM: 98 MCG/DL (ref 28–170)
PARATHYROID HORMONE INTACT: 59.5 U/ML (ref 12–88)
POTASSIUM SERPL-SCNC: 5.2 MEQ/L (ref 3.6–5)
SODIUM BLD-SCNC: 135 MEQ/L (ref 135–145)
TRANSFERRIN: 213 MG/DL (ref 192–382)
VITAMIN D 25-HYDROXY: 30.4 NG/ML (ref 30–100)

## 2022-02-10 NOTE — TELEPHONE ENCOUNTER
----- Message from Ofe Simon MD sent at 2/10/2022  2:29 PM EST -----  Serum creatinine is increased from before  Serum potassium is slightly high  Any new medicines ?   Increase fluid intake  Hold losartan for now  Monitor blood pressure and call us if it is high

## 2022-02-15 ENCOUNTER — OFFICE VISIT (OUTPATIENT)
Dept: NEPHROLOGY | Age: 77
End: 2022-02-15
Payer: MEDICARE

## 2022-02-15 VITALS
WEIGHT: 155.6 LBS | SYSTOLIC BLOOD PRESSURE: 178 MMHG | DIASTOLIC BLOOD PRESSURE: 67 MMHG | BODY MASS INDEX: 25.11 KG/M2 | TEMPERATURE: 96.9 F | OXYGEN SATURATION: 100 % | HEART RATE: 67 BPM

## 2022-02-15 DIAGNOSIS — I10 ESSENTIAL HYPERTENSION: ICD-10-CM

## 2022-02-15 DIAGNOSIS — R80.9 PROTEINURIA, UNSPECIFIED TYPE: ICD-10-CM

## 2022-02-15 DIAGNOSIS — E55.9 VITAMIN D DEFICIENCY: ICD-10-CM

## 2022-02-15 DIAGNOSIS — N18.32 STAGE 3B CHRONIC KIDNEY DISEASE (HCC): Primary | ICD-10-CM

## 2022-02-15 PROCEDURE — 99213 OFFICE O/P EST LOW 20 MIN: CPT | Performed by: INTERNAL MEDICINE

## 2022-02-15 RX ORDER — AMLODIPINE BESYLATE 5 MG/1
10 TABLET ORAL DAILY
Qty: 90 TABLET | Refills: 3 | Status: SHIPPED | OUTPATIENT
Start: 2022-02-15 | End: 2022-03-04 | Stop reason: SDUPTHER

## 2022-02-15 NOTE — PATIENT INSTRUCTIONS
Low Potassium Diet:       Foods are low in potassium. Plan to eat these every day. But don't eat more than 4 servings a day. A serving equals 1 small piece of fruit or ½ cup. Fresh fruit: Apples, applesauce, grapes, and pineapple. Canned fruit: Peaches and pears. Vegetables: Green or wax beans, cabbage, lettuce, radishes, and green peas. Foods are high in potassium. Don't eat more than 1 serving of these a day. A serving equals 1 small piece of fruit or ½ cup. Fresh fruit: Peaches, pears, blackberries, grapefruit, boysenberries, strawberries, and cherries. Vegetables: Asparagus, carrots, beets, corn, turnips, canned tomatoes, and zucchini. Milk and yogurt. Don't eat more than 1 cup a day. Foods are very high in potassium. Avoid these foods. Fruits: Apricots, bananas, cantaloupe, dates, figs, honeydew, raisins, kiwi fruit, watermelon, nectarines, oranges, and orange juice. Vegetables: Avocados, artichokes, brussels sprouts, potatoes, leafy   green vegetables (such as spinach), winter squash, fresh tomatoes, yams, and dried peas, beans, and lentils. Clams, chocolate, nuts, molasses, and sardines. Lower potassium in potatoes by \"leaching\". Boil the potatoes in water and then drain the liquid off. Repeat the rinse and drain twice. Do not use    salt substitute or \"lite\" salt,    Taiwo Lite Salt   No Salt, Nu Salt. These often are very high in potassium.      Mrs Fuentes Olsen is ok to use since it does not contain potassium      Potassium Content of Foods   (listing by high to low content)    Food  Serving Size Potassium (mg)   Baked potato (flesh only) one medium 610   Sweet potato, baked one medium 542   Banana, raw  one medium 422   Spinach, cooked ½ C 420   Jin beans, cooked ½ C 373   Kidney beans, cooked ½ C 371   Lentils, cooked ½ C 366   Navy beans, cooked ½ C 354   Plums, dried, pitted five 350   Artichokes, cooked one medium 343   Mashed potatoes ½ C 343   Edamame/soybeans, green ½ C 338   Tomato, canned  ½ C 325   Raisins ¼ C 299   Tomato, raw one medium 292   Papaya one small 286   Peach one medium 285   Pistachios, dry roasted, salted  1 oz (47 nuts) 285   Pumpkin, cooked, mashed ½ C 282   French fries 10 fries 278   Mushrooms, white, cooked ½ C 278   Beets, cooked ½ C 259   [place] sprouts, cooked ½ C 247   Kiwi one medium 237   Orange, raw one medium 237   Green peas, cooked ½ C 217   Cantaloupe, raw ½ C 214   Pear, raw one medium 212   Almonds, dry roasted 1 oz (24 nuts) 202   Apricot, canned, juice pack ½ C 202   Asparagus, cooked ½ C 202   [place] squash, cooked ½ C 201   Apple, raw with skin one medium 195   Honeydew ½ C 194   Carrots, cooked ½ C 183   Onions, cooked ½ C 175   Spinach, raw 1 C 167   Corn, sweet yellow ½ C 163   Red guthrie pepper ½ C 157   Kale, cooked ½ C 150   Cabbage, cooked ½ C 147   Mustard greens, cooked ½ C 142   [place] ½ C 139   Figs, dried two figs 134   Summer squash, cooked ½ C 126   Grapes 10 grapes 120   Okra, cooked ½ C 108   Bamboo shoots, canned 1 C 108   Celery, raw one stalk 105   Peanut butter, creamy 1 Tbsp 104   Green beans, cooked ½ C 104   Cauliflower, cooked ½ C 91   Mushrooms, shitake, cooked ½ C 88   Watermelon ½ C 85   Cucumber, peeled ½ C 88   Iceberg lettuce 1 C 81   Tomato, sun dried one piece 80   Eggplant, cooked ½ C 69   Pickle one pickle 61   Ketchup 1 Tbsp 60   Radishes one radish 57     5   C=cup, mg=milligrams, oz=ounces, Tbsp=tablespoon    Reference  [place] Dept of Agriculture, Agricultural Research Service, [employer]  [employer] Database for Standard Reference, Release 25: Potassium, K (mg) content of selected foods per common measure      Please check your blood pressure twice a day mornings and evenings 5 to 7 days and bring the record to next appointment   Please check your blood pressure twice a day - evenings

## 2022-02-15 NOTE — PROGRESS NOTES
Renal Progress Note    Assessment and Plan:       Diagnosis Orders   1. Stage 3b chronic kidney disease ([facility] [address]     2. Essential hypertension     3. Vitamin D deficiency     4. Proteinuria, unspecified type       PLAN:   Lab result discussed with the patient. She understood. She had no questions. Serum potassium is from 2.0 mg/dL to 2.5 mg/dl  The etiology of this is not clear but I suspect effect of her losartan    Serum potassium is borderline high  Hold Losartan  Low potassium diet  Increase amlodipine from 5 mg a day to 10 mg a day to compensate for the losartan  Return visit in 3 months not 6 months  I advised her to monitor her blood pressure at home twice a day mornings and evenings for 5 days. Patient Active Problem List   Diagnosis    Acute renal failure (HCC)    CKD (chronic kidney disease), stage III ([address]    HTN (hypertension)    Hypothyroidism    HLD (hyperlipidemia)    Anemia    DM type 2 (diabetes mellitus, type 2) (HCC)    Vitamin D deficiency    Type 2 diabetes mellitus with stage 3 chronic kidney disease ([facility] [address]           Subjective:   Chief complaint:  Chief Complaint   Patient presents with    Chronic Kidney Disease     Stage IV      HPI:This is a follow up visit for Ms. anna Daniel who is here today for return appointment. I see her for chronic kidney disease. She was last seen about 6 months ago. Doing well since then with no complaint. She does not monitor her blood pressure months. Keshav any complaint,shortness of breath    ROS:  Pertinent positives stated above in HPI. All other systems were reviewed and were negative.   Medications:     Current Outpatient Medications   Medication Sig Dispense Refill    pioglitazone (ACTOS) 30 MG tablet Take 30 mg by mouth daily      saxagliptin (ONGLYZA) 2.5 MG TABS tablet Take 2.5 mg by mouth daily      levothyroxine (SYNTHROID) 125 MCG tablet Take 112 mcg by mouth Daily       atorvastatin (LIPITOR) 40 MG tablet Take 40 mg by mouth daily      Multiple Vitamins-Minerals (CENTRUM SILVER) TABS Take  by mouth daily.  glimepiride (AMARYL) 4 MG tablet Take 4 mg by mouth every morning (before breakfast)       losartan (COZAAR) 100 MG tablet Take 100 mg by mouth daily.  aspirin 81 MG tablet Take 81 mg by mouth daily.  amLODIPine (NORVASC) 5 MG tablet Take 5 mg by mouth daily. No current facility-administered medications for this visit.        Lab Results:    CBC:   Lab Results   Component Value Date    WBC 3.5 (L) 06/25/2015    HGB 11.6 (L) 02/10/2022    HCT 36.9 02/10/2022    MCV 84.58 06/25/2015     06/25/2015     BMP:    Lab Results   Component Value Date     02/10/2022     (L) 07/30/2021     (L) 02/03/2021    K 5.2 (H) 02/10/2022    K 5.4 (H) 07/30/2021    K 4.6 02/03/2021     02/10/2022     07/30/2021     02/03/2021    CO2 23 02/10/2022    CO2 20 (L) 07/30/2021    CO2 26 02/03/2021    BUN 58 (H) 02/10/2022    BUN 45 (H) 07/30/2021    BUN 47 (H) 02/03/2021    CREATININE 2.51 (H) 02/10/2022    CREATININE 2.09 (H) 07/30/2021    CREATININE 2.02 (H) 02/03/2021    GLUCOSE 140 (H) 02/10/2022    GLUCOSE 197 (H) 07/30/2021    GLUCOSE 237 (H) 02/03/2021      Hepatic:   Lab Results   Component Value Date    AST 37 07/25/2018    AST 30 06/25/2015    ALT 24 07/25/2018    ALT 18 06/25/2015    BILITOT 0.8 07/25/2018    BILITOT 0.6 06/25/2015    ALKPHOS 74 07/25/2018    ALKPHOS 64 06/25/2015     BNP: No results found for: BNP  Lipids:   Lab Results   Component Value Date    CHOL 233 (H) 07/25/2018    HDL 84 07/25/2018     INR: No results found for: INR  URINE: No results found for: NAUR, PROTUR  No results found for: NITRU, COLORU, PHUR, LABCAST, WBCUA, RBCUA, MUCUS, TRICHOMONAS, YEAST, BACTERIA, CLARITYU, SPECGRAV, LEUKOCYTESUR, UROBILINOGEN, BILIRUBINUR, BLOODU, GLUCOSEU, KETUA, AMORPHOUS   Microalbumen/Creatinine ratio:  No components found for: RUCREAT    Objective:   Vitals: BP (!) 178/67

## 2022-02-16 NOTE — TELEPHONE ENCOUNTER
Pt was seen in the office on 2/14/22. Pt did not receive msg. This was discussed w/her at the office visit.

## 2022-03-04 RX ORDER — AMLODIPINE BESYLATE 5 MG/1
10 TABLET ORAL DAILY
Qty: 180 TABLET | Refills: 3 | Status: SHIPPED | OUTPATIENT
Start: 2022-03-04 | End: 2022-05-16 | Stop reason: SDUPTHER

## 2022-05-13 LAB
ANION GAP SERPL CALCULATED.3IONS-SCNC: 5 MMOL/L (ref 4–12)
BUN BLDV-MCNC: 59 MG/DL (ref 7–20)
CALCIUM SERPL-MCNC: 9.5 MG/DL (ref 8.8–10.5)
CHLORIDE BLD-SCNC: 105 MEQ/L (ref 101–111)
CO2: 22 MEQ/L (ref 21–32)
CREAT SERPL-MCNC: 2.77 MG/DL (ref 0.6–1.3)
CREATININE CLEARANCE: 20
GLUCOSE: 285 MG/DL (ref 70–110)
PARATHYROID HORMONE INTACT: 55.4 U/ML (ref 12–88)
POTASSIUM SERPL-SCNC: 5 MEQ/L (ref 3.6–5)
SODIUM BLD-SCNC: 132 MEQ/L (ref 135–145)
VITAMIN D 25-HYDROXY: 29.1 NG/ML (ref 30–100)

## 2022-05-16 RX ORDER — AMLODIPINE BESYLATE 5 MG/1
10 TABLET ORAL DAILY
Qty: 180 TABLET | Refills: 3 | Status: SHIPPED | OUTPATIENT
Start: 2022-05-16 | End: 2022-05-19

## 2022-05-19 ENCOUNTER — OFFICE VISIT (OUTPATIENT)
Dept: NEPHROLOGY | Age: 77
End: 2022-05-19
Payer: MEDICARE

## 2022-05-19 VITALS
WEIGHT: 155.2 LBS | DIASTOLIC BLOOD PRESSURE: 63 MMHG | SYSTOLIC BLOOD PRESSURE: 167 MMHG | OXYGEN SATURATION: 97 % | HEART RATE: 61 BPM | BODY MASS INDEX: 25.05 KG/M2

## 2022-05-19 DIAGNOSIS — I10 ESSENTIAL HYPERTENSION: ICD-10-CM

## 2022-05-19 DIAGNOSIS — N18.4 CKD (CHRONIC KIDNEY DISEASE), STAGE IV (HCC): Primary | ICD-10-CM

## 2022-05-19 DIAGNOSIS — E87.1 HYPONATREMIA: ICD-10-CM

## 2022-05-19 DIAGNOSIS — R80.9 PROTEINURIA, UNSPECIFIED TYPE: ICD-10-CM

## 2022-05-19 DIAGNOSIS — E55.9 VITAMIN D DEFICIENCY: ICD-10-CM

## 2022-05-19 PROCEDURE — 99214 OFFICE O/P EST MOD 30 MIN: CPT | Performed by: INTERNAL MEDICINE

## 2022-05-19 RX ORDER — AMLODIPINE BESYLATE 10 MG/1
10 TABLET ORAL DAILY
COMMUNITY
End: 2022-05-19 | Stop reason: SDUPTHER

## 2022-05-19 RX ORDER — MULTIVIT-MIN/IRON/FOLIC ACID/K 18-600-40
2000 CAPSULE ORAL DAILY
COMMUNITY

## 2022-05-19 RX ORDER — AMLODIPINE BESYLATE 10 MG/1
10 TABLET ORAL DAILY
Qty: 90 TABLET | Refills: 1 | Status: SHIPPED | OUTPATIENT
Start: 2022-05-19 | End: 2022-05-20 | Stop reason: SDUPTHER

## 2022-05-19 RX ORDER — LEVOTHYROXINE SODIUM 112 UG/1
112 TABLET ORAL DAILY
COMMUNITY

## 2022-05-19 NOTE — PROGRESS NOTES
Renal Progress Note    Assessment and Plan:      Diagnosis Orders   1. CKD (chronic kidney disease), stage IV ([facility])  Basic Metabolic Panel   2. Essential hypertension     3. Proteinuria, unspecified type     4. Vitamin D deficiency     5. Hyponatremia               PLAN:  1. Lab results are reviewed with the patient in epic. 2. She understood. 3. She had no questions. 4. Serum creatinine is increased to 2.77mg/L from 2.54 mg/dL. 5. This is most likely due to Sitagliptin. 6. I discussed that with the patient. 7. Vitamin D level is decreased to 29 from 30.4 before. 8. Serum sodium is slightly low at 132 mEq/L.  9. Medications reviewed  10. Discontinue sitagliptin  11. Increase vitamin D3 from 2000 international units daily to twice a day. 12. I encouraged her to drink more fluid. 15. Return visit in 4 weeks with labs          Patient Active Problem List   Diagnosis    Acute renal failure ([facility] [address]    CKD (chronic kidney disease), stage III ([facility] [address]    HTN (hypertension)    Hypothyroidism    HLD (hyperlipidemia)    Anemia    DM type 2 (diabetes mellitus, type 2) ([facility])    Vitamin D deficiency    Type 2 diabetes mellitus with stage 3 chronic kidney disease ([facility] [address]           Subjective:   Chief complaint:  Chief Complaint   Patient presents with    Chronic Kidney Disease     Stage IV      HPI:This is a follow up visit for Ms. John Serrano who is here today for return appointment. I see her for chronic kidney disease. She was last seen about 3 months ago. We see her every 6 months. However, because of worsening kidney function last time we  Asked her to come back in 3 months. Doing well with no complaint. No new medicines since I saw her. .  No chest pain shortness of breath. No nausea or vomiting. No fever or chills. ROS:  Pertinent positives stated above in HPI. All other systems were reviewed and were negative.   Medications:     Current Outpatient Medications   Medication Sig Dispense Refill    amLODIPine (NORVASC) 10 MG tablet Take 10 mg by mouth daily      Cholecalciferol (VITAMIN D) 50 MCG (2000 UT) CAPS capsule Take 2,000 Units by mouth daily      levothyroxine (SYNTHROID) 112 MCG tablet Take 112 mcg by mouth Daily      pioglitazone (ACTOS) 30 MG tablet Take 30 mg by mouth daily      saxagliptin (ONGLYZA) 2.5 MG TABS tablet Take 2.5 mg by mouth daily      atorvastatin (LIPITOR) 40 MG tablet Take 40 mg by mouth daily      Multiple Vitamins-Minerals (CENTRUM SILVER) TABS Take by mouth three times a week       glimepiride (AMARYL) 4 MG tablet Take 4 mg by mouth every morning (before breakfast)       aspirin 81 MG tablet Take 81 mg by mouth daily. No current facility-administered medications for this visit.        Lab Results:    CBC:   Lab Results   Component Value Date    WBC 3.5 (L) 06/25/2015    HGB 11.6 (L) 02/10/2022    HCT 36.9 02/10/2022    MCV 84.58 06/25/2015     06/25/2015     BMP:    Lab Results   Component Value Date     (L) 05/13/2022     02/10/2022     (L) 07/30/2021    K 5.0 05/13/2022    K 5.2 (H) 02/10/2022    K 5.4 (H) 07/30/2021     05/13/2022     02/10/2022     07/30/2021    CO2 22 05/13/2022    CO2 23 02/10/2022    CO2 20 (L) 07/30/2021    BUN 59 (H) 05/13/2022    BUN 58 (H) 02/10/2022    BUN 45 (H) 07/30/2021    CREATININE 2.77 (H) 05/13/2022    CREATININE 2.51 (H) 02/10/2022    CREATININE 2.09 (H) 07/30/2021    GLUCOSE 285 (H) 05/13/2022    GLUCOSE 140 (H) 02/10/2022    GLUCOSE 197 (H) 07/30/2021      Hepatic:   Lab Results   Component Value Date    AST 37 07/25/2018    AST 30 06/25/2015    ALT 24 07/25/2018    ALT 18 06/25/2015    BILITOT 0.8 07/25/2018    BILITOT 0.6 06/25/2015    ALKPHOS 74 07/25/2018    ALKPHOS 64 06/25/2015     BNP: No results found for: BNP  Lipids:   Lab Results   Component Value Date    CHOL 233 (H) 07/25/2018    HDL 84 07/25/2018     INR: No results found for: INR  URINE: No results found for: NAUR, PROTUR  No results found for: [place]il[place] Spine, PHUR, LABCAST, WBCUA, RBCUA, MUCUS, TRICHOMONAS, YEAST, BACTERIA, CLARITYU, SPECGRAV, LEUKOCYTESUR, UROBILINOGEN, BILIRUBINUR, BLOODU, GLUCOSEU, KETUA, AMORPHOUS   Microalbumen/Creatinine ratio:  No components found for: RUCREAT    Objective:   Vitals: BP (!) 167/63 (Site: Left Upper Arm, Position: Sitting, Cuff Size: Medium Adult)   Pulse 61   Wt 155 lb 3.2 oz (70.4 kg)   SpO2 97%   BMI 25.05 kg/m²      Constitutional:  Alert, awake, no apparent distress  Skin:normal with no rash or any significant lesions  HEENT:Pupils are reactive . Throat is clear. Oral mucosa is moist.  Neck:supple with no thyromegaly, JVD, lymphadenopathy or bruit   Cardiovascular: Regular sinus rhythm without murmur, rubs or gallops   Respiratory:  Clear to auscultation with no wheezes or rales  Abdomen: Good bowel sound, soft, non tender and no bruit  Ext: No LE edema  Musculoskeletal:Intact  Neuro:Alert, awake and oriented with no obvious focal deficit. Speech is normal.    Electronically signed by Mitch Rich MD on 5/19/2022 at 2:45 PM   **This report has been created using voice recognition software. It maycontain minor  errors which are inherent in voice recognition technology. **

## 2022-05-19 NOTE — PATIENT INSTRUCTIONS
Please take vitamin D3 1 capsule of 2000 international unit twice a day instead of once a day. Discontinue your blood sugar medicine Sitagliptin.

## 2022-05-20 RX ORDER — AMLODIPINE BESYLATE 10 MG/1
10 TABLET ORAL DAILY
Qty: 90 TABLET | Refills: 3 | Status: SHIPPED | OUTPATIENT
Start: 2022-05-20

## 2022-06-07 LAB
ANION GAP SERPL CALCULATED.3IONS-SCNC: 6 MMOL/L (ref 4–12)
BUN BLDV-MCNC: 54 MG/DL (ref 7–20)
CALCIUM SERPL-MCNC: 9.6 MG/DL (ref 8.8–10.5)
CHLORIDE BLD-SCNC: 101 MEQ/L (ref 101–111)
CO2: 24 MEQ/L (ref 21–32)
CREAT SERPL-MCNC: 2.87 MG/DL (ref 0.6–1.3)
CREATININE CLEARANCE: 19
GLUCOSE: 357 MG/DL (ref 70–110)
POTASSIUM SERPL-SCNC: 5.6 MEQ/L (ref 3.6–5)
SODIUM BLD-SCNC: 131 MEQ/L (ref 135–145)

## 2022-06-08 ENCOUNTER — TELEPHONE (OUTPATIENT)
Dept: NEPHROLOGY | Age: 77
End: 2022-06-08

## 2022-06-08 DIAGNOSIS — E87.5 HYPERKALEMIA: Primary | ICD-10-CM

## 2022-06-08 RX ORDER — PATIROMER 8.4 G/1
8.4 POWDER, FOR SUSPENSION ORAL DAILY
Qty: 3 EACH | Refills: 0 | COMMUNITY
Start: 2022-06-08 | End: 2022-07-12

## 2022-06-08 NOTE — TELEPHONE ENCOUNTER
Low Potassium Diet:       Foods are low in potassium. Plan to eat these every day. But don't eat more than 4 servings a day. A serving equals 1 small piece of fruit or ½ cup. Fresh fruit: Apples, applesauce, grapes, and pineapple. Canned fruit: Peaches and pears. Vegetables: Green or wax beans, cabbage, lettuce, radishes, and green peas. Foods are high in potassium. Don't eat more than 1 serving of these a day. A serving equals 1 small piece of fruit or ½ cup. Fresh fruit: Peaches, pears, blackberries, grapefruit, boysenberries, strawberries, and cherries. Vegetables: Asparagus, carrots, beets, corn, turnips, canned tomatoes, and zucchini. Milk and yogurt. Don't eat more than 1 cup a day. Foods are very high in potassium. Avoid these foods. Fruits: Apricots, bananas, cantaloupe, dates, figs, honeydew, raisins, kiwi fruit, watermelon, nectarines, oranges, and orange juice. Vegetables: Avocados, artichokes, brussels sprouts, potatoes, leafy   green vegetables (such as spinach), winter squash, fresh tomatoes, yams, and dried peas, beans, and lentils. Clams, chocolate, nuts, molasses, and sardines. Lower potassium in potatoes by \"leaching\". Boil the potatoes in water and then drain the liquid off. Repeat the rinse and drain twice. Do not use    salt substitute or \"lite\" salt,    Taiwo Lite Salt   No Salt, Nu Salt. These often are very high in potassium.      Mrs Harjinder Lee is ok to use since it does not contain potassium      Potassium Content of Foods   (listing by high to low content)    Food  Serving Size Potassium (mg)   Baked potato (flesh only) one medium 610   Sweet potato, baked one medium 542   Banana, raw  one medium 422   Spinach, cooked ½ C 420   Jin beans, cooked ½ C 373   Kidney beans, cooked ½ C 371   Lentils, cooked ½ C 366   Navy beans, cooked ½ C 354   Plums, dried, pitted five 350   Artichokes, cooked one medium 343   Mashed potatoes ½ C 343   Edamame/soybeans, green ½ C 338   Tomato, canned  ½ C 325   Raisins ¼ C 299   Tomato, raw one medium 292   Papaya one small 286   Peach one medium 285   Pistachios, dry roasted, salted  1 oz (47 nuts) 285   Pumpkin, cooked, mashed ½ C 282   French fries 10 fries 278   Mushrooms, white, cooked ½ C 278   Beets, cooked ½ C 259   [place] sprouts, cooked ½ C 247   Kiwi one medium 237   Orange, raw one medium 237   Green peas, cooked ½ C 217   Cantaloupe, raw ½ C 214   Pear, raw one medium 212   Almonds, dry roasted 1 oz (24 nuts) 202   Apricot, canned, juice pack ½ C 202   Asparagus, cooked ½ C 202   [place] squash, cooked ½ C 201   Apple, raw with skin one medium 195   Honeydew ½ C 194   Carrots, cooked ½ C 183   Onions, cooked ½ C 175   Spinach, raw 1 C 167   Corn, sweet yellow ½ C 163   Red guthrie pepper ½ C 157   Kale, cooked ½ C 150   Cabbage, cooked ½ C 147   Mustard greens, cooked ½ C 142   [place] ½ C 139   Figs, dried two figs 134   Summer squash, cooked ½ C 126   Grapes 10 grapes 120   Okra, cooked ½ C 108   Bamboo shoots, canned 1 C 108   Celery, raw one stalk 105   Peanut butter, creamy 1 Tbsp 104   Green beans, cooked ½ C 104   Cauliflower, cooked ½ C 91   Mushrooms, shitake, cooked ½ C 88   Watermelon ½ C 85   Cucumber, peeled ½ C 88   Iceberg lettuce 1 C 81   Tomato, sun dried one piece 80   Eggplant, cooked ½ C 69   Pickle one pickle 61   Ketchup 1 Tbsp 60   Radishes one radish 57     5   C=cup, mg=milligrams, oz=ounces, Tbsp=tablespoon    Reference  [place] Dept of Agriculture, Agricultural Research Service, [employer]  [employer] Database for Standard Reference, Release 25: Potassium, K (mg) content of selected foods per common measure

## 2022-06-08 NOTE — TELEPHONE ENCOUNTER
----- Message from Shama Pineda MD sent at 6/7/2022  4:29 PM EDT -----  Serum potassium slightly high  Low potassium diet  Patiromer 8.4 g or sodium zirconium cyclosilicate 10 g once a day for 3 days  Repeat potassium in 5 to 6 days

## 2022-06-08 NOTE — TELEPHONE ENCOUNTER
Kateryna called she will come by the office to get Veltassa 8.4 g today. Veltassa Sample  Lot: [id number]  Exp: 3/24  3 pkts.

## 2022-06-11 LAB — POTASSIUM (K+): 5

## 2022-06-14 NOTE — TELEPHONE ENCOUNTER
Patient called back.   States she got her labs done on 3-11-22 at [facility], operated by [facility].

## 2022-06-16 ENCOUNTER — OFFICE VISIT (OUTPATIENT)
Dept: NEPHROLOGY | Age: 77
End: 2022-06-16
Payer: MEDICARE

## 2022-06-16 VITALS
WEIGHT: 151 LBS | SYSTOLIC BLOOD PRESSURE: 167 MMHG | HEART RATE: 65 BPM | DIASTOLIC BLOOD PRESSURE: 65 MMHG | BODY MASS INDEX: 24.37 KG/M2 | OXYGEN SATURATION: 99 %

## 2022-06-16 DIAGNOSIS — R80.9 PROTEINURIA, UNSPECIFIED TYPE: ICD-10-CM

## 2022-06-16 DIAGNOSIS — E55.9 VITAMIN D DEFICIENCY: ICD-10-CM

## 2022-06-16 DIAGNOSIS — I10 ESSENTIAL HYPERTENSION: ICD-10-CM

## 2022-06-16 DIAGNOSIS — N18.4 CKD (CHRONIC KIDNEY DISEASE), STAGE IV (HCC): Primary | ICD-10-CM

## 2022-06-16 PROCEDURE — 1123F ACP DISCUSS/DSCN MKR DOCD: CPT | Performed by: INTERNAL MEDICINE

## 2022-06-16 PROCEDURE — 99213 OFFICE O/P EST LOW 20 MIN: CPT | Performed by: INTERNAL MEDICINE

## 2022-06-16 NOTE — PROGRESS NOTES
Renal Progress Note    Assessment and Plan:      Diagnosis Orders   1. CKD (chronic kidney disease), stage IV ([facility] [address]     2. Proteinuria, unspecified type     3. Vitamin D deficiency     4. Essential hypertension               PLAN:  1. Lab result reviewed with the patient. 2. She understood. 3. Serum creatinine is increased from 2.77 mg/dL up to 2.87 mg/dL. 4. Etiology of this is not certain. 5. However we suspect effect of atorvastatin. 6. There is also some prerenal component. 7. I encouraged her to increase her fluid intake significantly. 8. Would like to discontinue atorvastatin for now. 9. This was discussed with her primary care provider Dr. Faith Woodall who is in agreement. 10. We will check serologies for the sake of completeness. 11.  Kappa with lambda free light chain assay ratio. 12.  Return visit in 4 weeks with labs. Patient Active Problem List   Diagnosis    Acute renal failure (HCC)    CKD (chronic kidney disease), stage III ([facility] [address]    HTN (hypertension)    Hypothyroidism    HLD (hyperlipidemia)    Anemia    DM type 2 (diabetes mellitus, type 2) (HCC)    Vitamin D deficiency    Type 2 diabetes mellitus with stage 3 chronic kidney disease (HCC)           Subjective:   Chief complaint:  Chief Complaint   Patient presents with    Follow-up      HPI:This is a follow up visit for Ms. Stefany Lei who is here today for return appointment. We see her for chronic kidney disease. She was just seen about 4 weeks ago. Serum creatinine had increased from 2.51 mg/dL to 2.77 mg/dL. As a result he was asked to come back in 4 weeks. Doing well since then. Denies any chest pain or shortness of breath. No difficulties with urination. No new medication. No use of nonsteroidal anti-inflammatory medications according to her. ROS:  Pertinent positives stated above in HPI. All other systems were reviewed and were negative.   Medications:     Current Outpatient Medications   Medication Sig Dispense Refill    patiromer sorbitex calcium (VELTASSA) 8.4 g PACK packet Take 1 packet by mouth daily 3 each 0    amLODIPine (NORVASC) 10 MG tablet Take 1 tablet by mouth daily 90 tablet 3    Cholecalciferol (VITAMIN D) 50 MCG (2000 UT) CAPS capsule Take 2,000 Units by mouth daily      levothyroxine (SYNTHROID) 112 MCG tablet Take 112 mcg by mouth Daily      pioglitazone (ACTOS) 30 MG tablet Take 30 mg by mouth daily      atorvastatin (LIPITOR) 40 MG tablet Take 40 mg by mouth daily      Multiple Vitamins-Minerals (CENTRUM SILVER) TABS Take by mouth three times a week       glimepiride (AMARYL) 4 MG tablet Take 4 mg by mouth every morning (before breakfast)       aspirin 81 MG tablet Take 81 mg by mouth daily. No current facility-administered medications for this visit.        Lab Results:    CBC:   Lab Results   Component Value Date    WBC 3.5 (L) 06/25/2015    HGB 11.6 (L) 02/10/2022    HCT 36.9 02/10/2022    MCV 84.58 06/25/2015     06/25/2015     BMP:    Lab Results   Component Value Date     (L) 06/07/2022     (L) 05/13/2022     02/10/2022    K 5.6 (H) 06/07/2022    K 5.0 05/13/2022    K 5.2 (H) 02/10/2022     06/07/2022     05/13/2022     02/10/2022    CO2 24 06/07/2022    CO2 22 05/13/2022    CO2 23 02/10/2022    BUN 54 (H) 06/07/2022    BUN 59 (H) 05/13/2022    BUN 58 (H) 02/10/2022    CREATININE 2.87 (H) 06/07/2022    CREATININE 2.77 (H) 05/13/2022    CREATININE 2.51 (H) 02/10/2022    GLUCOSE 357 (H) 06/07/2022    GLUCOSE 285 (H) 05/13/2022    GLUCOSE 140 (H) 02/10/2022      Hepatic:   Lab Results   Component Value Date    AST 37 07/25/2018    AST 30 06/25/2015    ALT 24 07/25/2018    ALT 18 06/25/2015    BILITOT 0.8 07/25/2018    BILITOT 0.6 06/25/2015    ALKPHOS 74 07/25/2018    ALKPHOS 64 06/25/2015     BNP: No results found for: BNP  Lipids:   Lab Results   Component Value Date    CHOL 233 (H) 07/25/2018    HDL 84 07/25/2018     INR: No results found for: INR  URINE: No results found for: NAUR, PROTUR  No results found for: Eward Tere, PHUR, LABCAST, WBCUA, RBCUA, MUCUS, TRICHOMONAS, YEAST, BACTERIA, CLARITYU, SPECGRAV, LEUKOCYTESUR, UROBILINOGEN, BILIRUBINUR, BLOODU, GLUCOSEU, KETUA, AMORPHOUS   Microalbumen/Creatinine ratio:  No components found for: RUCREAT    Objective:   Vitals: BP (!) 167/65 (Site: Right Upper Arm, Position: Sitting, Cuff Size: Large Adult)   Pulse 65   Wt 151 lb (68.5 kg)   SpO2 99%   BMI 24.37 kg/m²      Constitutional:  Alert, awake, no apparent distress  Skin:normal with no rash or any significant lesions  HEENT:Pupils are reactive . Throat is clear. Oral mucosa is moist.  Neck:supple with no thyromegaly, JVD, lymphadenopathy or bruit   Cardiovascular: Regular sinus rhythm without murmur, rubs or gallops   Respiratory:  Clear to auscultation with no wheezes or rales  Abdomen: Good bowel sound, soft, non tender and no bruit  Ext: No LE edema  Musculoskeletal:Intact  Neuro:Alert, awake and oriented with no obvious focal deficit. Speech is normal.    Electronically signed by Dia Monsalve MD on 6/16/2022 at 1:52 PM   **This report has been created using voice recognition software. It maycontain minor  errors which are inherent in voice recognition technology. **

## 2022-06-17 ENCOUNTER — TELEPHONE (OUTPATIENT)
Dept: NEPHROLOGY | Age: 77
End: 2022-06-17

## 2022-06-17 NOTE — TELEPHONE ENCOUNTER
----- Message from Emely Brunson MD sent at 6/17/2022  5:20 AM EDT -----  Serum potassium is back to normal

## 2022-07-07 LAB
ANION GAP SERPL CALCULATED.3IONS-SCNC: 7 MMOL/L (ref 4–12)
BUN BLDV-MCNC: 41 MG/DL (ref 7–20)
CALCIUM SERPL-MCNC: 9.8 MG/DL (ref 8.8–10.5)
CHLORIDE BLD-SCNC: 103 MEQ/L (ref 101–111)
CO2: 23 MEQ/L (ref 21–32)
CREAT SERPL-MCNC: 2.34 MG/DL (ref 0.6–1.3)
CREATININE CLEARANCE: 25
CREATININE, RANDOM URINE: 143.4 MG/DL
GLUCOSE, FASTING: 297 MG/DL (ref 70–110)
POTASSIUM SERPL-SCNC: 4.9 MEQ/L (ref 3.6–5)
PROTEIN, URINE, RANDOM: 179.3 MG/DL
PROTEIN/CREAT RATIO: 1.25 G/1.73M2
SODIUM BLD-SCNC: 133 MEQ/L (ref 135–145)

## 2022-07-08 LAB
COMPLEMENT C3: 101 MG/DL (ref 75–175)
COMPLEMENT C4: 44 MG/DL (ref 14–40)
FREE LIGHT CHAINS,SERUM: ABNORMAL

## 2022-07-12 ENCOUNTER — OFFICE VISIT (OUTPATIENT)
Dept: NEPHROLOGY | Age: 77
End: 2022-07-12
Payer: MEDICARE

## 2022-07-12 VITALS
SYSTOLIC BLOOD PRESSURE: 178 MMHG | OXYGEN SATURATION: 98 % | BODY MASS INDEX: 23.73 KG/M2 | WEIGHT: 147 LBS | DIASTOLIC BLOOD PRESSURE: 66 MMHG | HEART RATE: 61 BPM

## 2022-07-12 DIAGNOSIS — I10 ESSENTIAL HYPERTENSION: ICD-10-CM

## 2022-07-12 DIAGNOSIS — R80.9 PROTEINURIA, UNSPECIFIED TYPE: ICD-10-CM

## 2022-07-12 DIAGNOSIS — N18.4 CKD (CHRONIC KIDNEY DISEASE), STAGE IV (HCC): Primary | ICD-10-CM

## 2022-07-12 DIAGNOSIS — E55.9 VITAMIN D DEFICIENCY: ICD-10-CM

## 2022-07-12 DIAGNOSIS — E87.1 HYPONATREMIA: ICD-10-CM

## 2022-07-12 LAB
DOUBLE STRANDED DNA AB, IGG: 1 IU/ML
ENA ANTIBODIES SCREEN: 0.2 RATIO

## 2022-07-12 PROCEDURE — 99213 OFFICE O/P EST LOW 20 MIN: CPT | Performed by: INTERNAL MEDICINE

## 2022-07-12 PROCEDURE — 1123F ACP DISCUSS/DSCN MKR DOCD: CPT | Performed by: INTERNAL MEDICINE

## 2022-07-12 NOTE — PROGRESS NOTES
Renal Progress Note    Assessment and Plan:      Diagnosis Orders   1. CKD (chronic kidney disease), stage IV ([address]     2. Essential hypertension     3. Proteinuria, unspecified type     4. Vitamin D deficiency     5. Hyponatremia               PLAN:  1. Lab results are reviewed with the patient. 2. She understood. 3. We went through the report together in epic. 4. Serum creatinine is improved to 2.34 mg/dL from 2.87 mg/L 4 weeks ago. 5. Urine protein creatinine ratio is 1.25 g  6. Kappa with lambda Free light chain assay ratio is unremarkable  7. Complement #3 is normal  8. Complement #4 very slightly elevated and is of no clinical significant  9. Medications reviewed  10. Continue to hold atorvastatin for now  11. Will repete labs in 3 months   12. If serum creatinine improves more we will restart atorvastatin at 40 mg instead of 80 mg a day  13. This was discussed with the patient  14. Return visit in 3 months with labs          Patient Active Problem List   Diagnosis    Acute renal failure ([address]    CKD (chronic kidney disease), stage III ([address]    HTN (hypertension)    Hypothyroidism    HLD (hyperlipidemia)    Anemia    DM type 2 (diabetes mellitus, type 2) (HCC)    Vitamin D deficiency    Type 2 diabetes mellitus with stage 3 chronic kidney disease ([address]           Subjective:   Chief complaint:  Chief Complaint   Patient presents with    Chronic Kidney Disease     Stage IV      HPI:This is a follow up visit for Ms. Erlinda Traore who is here today for return appointment. I see her for chronic kidney disease. She was last seen about 4 weeks ago. .  Serum creatinine had increased from 2.7 mg/L to 2.87 mg/dL. .  We discontinued her atorvastatin 80 mg a day after discussing with the prescriber the primary care provider Dr. Mimi Gonzalez. Serum creatinine is improved to 2.34 mg/dL. Doing well with no complaint. No chest pain. No shortness of breath. No nausea or vomiting. No fever or chills.   No headaches. ROS:  Pertinent positives stated above in HPI. All other systems were reviewed and were negative. Medications:     Current Outpatient Medications   Medication Sig Dispense Refill    amLODIPine (NORVASC) 10 MG tablet Take 1 tablet by mouth daily 90 tablet 3    Cholecalciferol (VITAMIN D) 50 MCG (2000 UT) CAPS capsule Take 2,000 Units by mouth daily      levothyroxine (SYNTHROID) 112 MCG tablet Take 112 mcg by mouth Daily      pioglitazone (ACTOS) 30 MG tablet Take 30 mg by mouth daily      Multiple Vitamins-Minerals (CENTRUM SILVER) TABS Take by mouth three times a week       glimepiride (AMARYL) 4 MG tablet Take 4 mg by mouth every morning (before breakfast)       aspirin 81 MG tablet Take 81 mg by mouth daily. No current facility-administered medications for this visit.        Lab Results:    CBC:   Lab Results   Component Value Date    WBC 3.5 (L) 06/25/2015    HGB 11.6 (L) 02/10/2022    HCT 36.9 02/10/2022    MCV 84.58 06/25/2015     06/25/2015     BMP:    Lab Results   Component Value Date     (L) 07/07/2022     (L) 06/07/2022     (L) 05/13/2022    K 4.9 07/07/2022    K 5.0 06/11/2022    K 5.6 (H) 06/07/2022     07/07/2022     06/07/2022     05/13/2022    CO2 23 07/07/2022    CO2 24 06/07/2022    CO2 22 05/13/2022    BUN 41 (H) 07/07/2022    BUN 54 (H) 06/07/2022    BUN 59 (H) 05/13/2022    CREATININE 2.34 (H) 07/07/2022    CREATININE 2.87 (H) 06/07/2022    CREATININE 2.77 (H) 05/13/2022    GLUCOSE 357 (H) 06/07/2022    GLUCOSE 285 (H) 05/13/2022    GLUCOSE 140 (H) 02/10/2022      Hepatic:   Lab Results   Component Value Date    AST 37 07/25/2018    AST 30 06/25/2015    ALT 24 07/25/2018    ALT 18 06/25/2015    BILITOT 0.8 07/25/2018    BILITOT 0.6 06/25/2015    ALKPHOS 74 07/25/2018    ALKPHOS 64 06/25/2015     BNP: No results found for: BNP  Lipids:   Lab Results   Component Value Date    CHOL 233 (H) 07/25/2018    HDL 84 07/25/2018     INR: No results found for: INR  URINE: No results found for: NAUR, PROTUR  No results found for: NITRU, COLORU, PHUR, LABCAST, WBCUA, RBCUA, MUCUS, TRICHOMONAS, YEAST, BACTERIA, CLARITYU, SPECGRAV, LEUKOCYTESUR, UROBILINOGEN, BILIRUBINUR, BLOODU, GLUCOSEU, KETUA, AMORPHOUS   Microalbumen/Creatinine ratio:  No components found for: RUCREAT    Objective:   Vitals: BP (!) 178/66 (Site: Right Upper Arm, Position: Sitting, Cuff Size: Medium Adult)   Pulse 61   Wt 147 lb (66.7 kg)   SpO2 98%   BMI 23.73 kg/m²      Constitutional:  Alert, awake, no apparent distress  Skin:normal with no rash or any significant lesions  HEENT:Pupils are reactive . Throat is clear. Oral mucosa is moist.  Neck:supple with no thyromegaly, JVD, lymphadenopathy or bruit   Cardiovascular: Regular sinus rhythm without murmur, rubs or gallops   Respiratory:  Clear to auscultation with no wheezes or rales  Abdomen: Good bowel sound, soft, non tender and no bruit  Ext: No LE edema  Musculoskeletal:Intact  Neuro:Alert, awake and oriented with no obvious focal deficit. Speech is normal.    Electronically signed by Anastacia Gracia MD on 7/12/2022 at 1:19 PM   **This report has been created using voice recognition software. It maycontain minor  errors which are inherent in voice recognition technology. **

## 2022-11-29 LAB
ANION GAP SERPL CALCULATED.3IONS-SCNC: 5 MMOL/L (ref 4–12)
BUN BLDV-MCNC: 58 MG/DL (ref 7–20)
CALCIUM SERPL-MCNC: 9.7 MG/DL (ref 8.8–10.5)
CHLORIDE BLD-SCNC: 106 MEQ/L (ref 101–111)
CO2: 25 MEQ/L (ref 21–32)
CREAT SERPL-MCNC: 2.49 MG/DL (ref 0.6–1.3)
CREATININE CLEARANCE: 23
GLUCOSE: 111 MG/DL (ref 70–110)
PARATHYROID HORMONE INTACT: 42 U/ML (ref 12–88)
POTASSIUM SERPL-SCNC: 4.8 MEQ/L (ref 3.6–5)
SODIUM BLD-SCNC: 136 MEQ/L (ref 135–145)
VITAMIN D 25-HYDROXY: 58.7 NG/ML (ref 30–100)

## 2022-12-05 ENCOUNTER — OFFICE VISIT (OUTPATIENT)
Dept: NEPHROLOGY | Age: 77
End: 2022-12-05
Payer: MEDICARE

## 2022-12-05 VITALS
HEART RATE: 65 BPM | DIASTOLIC BLOOD PRESSURE: 63 MMHG | OXYGEN SATURATION: 100 % | WEIGHT: 153.4 LBS | BODY MASS INDEX: 24.76 KG/M2 | SYSTOLIC BLOOD PRESSURE: 157 MMHG

## 2022-12-05 DIAGNOSIS — I10 PRIMARY HYPERTENSION: ICD-10-CM

## 2022-12-05 DIAGNOSIS — N18.4 CKD (CHRONIC KIDNEY DISEASE), STAGE IV (HCC): Primary | ICD-10-CM

## 2022-12-05 DIAGNOSIS — D63.8 ANEMIA OF CHRONIC DISEASE: ICD-10-CM

## 2022-12-05 DIAGNOSIS — E11.21 DIABETIC NEPHROPATHY ASSOCIATED WITH TYPE 2 DIABETES MELLITUS (HCC): ICD-10-CM

## 2022-12-05 PROCEDURE — 3078F DIAST BP <80 MM HG: CPT | Performed by: INTERNAL MEDICINE

## 2022-12-05 PROCEDURE — 3075F SYST BP GE 130 - 139MM HG: CPT | Performed by: INTERNAL MEDICINE

## 2022-12-05 PROCEDURE — 1123F ACP DISCUSS/DSCN MKR DOCD: CPT | Performed by: INTERNAL MEDICINE

## 2022-12-05 PROCEDURE — 99214 OFFICE O/P EST MOD 30 MIN: CPT | Performed by: INTERNAL MEDICINE

## 2022-12-05 NOTE — PROGRESS NOTES
Renal Progress Note    Assessment and Plan:      Diagnosis Orders   1. CKD (chronic kidney disease), stage IV ([facility]        2. Diabetic nephropathy associated with type 2 diabetes mellitus ([facility]        3. Primary hypertension                  PLAN:  Labs reviewed with the patient  She understood  Addressed her questions  Serum creatinine is slightly increased to 2.49 mg per from 2.34 mg/L before but still within her baseline. Vitamin D level is normal at 58.7  PTH is good at 42.0  Medications reviewed  No changes  Low-salt diet  Low-protein diet information provided to her  Advised her to monitor her blood pressure at home twice a day morning and evening for 5 days and call the office with report  Return visit in 3 months with labs otherwise          Patient Active Problem List   Diagnosis    Acute renal failure ([facility]    CKD (chronic kidney disease), stage III ([facility]    HTN (hypertension)    Hypothyroidism    HLD (hyperlipidemia)    Anemia    DM type 2 (diabetes mellitus, type 2) ([facility]    Vitamin D deficiency    Type 2 diabetes mellitus with stage 3 chronic kidney disease ([facility]           Subjective:   Chief complaint:  Chief Complaint   Patient presents with    Follow-up     Follow up       HPI:This is a follow up visit for Ms Spring Eboni here today for a follow up appointment . Sees her for chronic kidney disease . Sees her for chronic kidney disease among other things  and was last seen about 3 months ago. Doing well with no complaint. Denies chest pain,shortness of breath,fever ,chills ,nausea or vomiting    ROS:  Pertinent positives stated above in HPI. All other systems were reviewed and were negative.   Medications:     Current Outpatient Medications   Medication Sig Dispense Refill    sAXagliptin (ONGLYZA) 2.5 MG TABS tablet Take 2.5 mg by mouth daily      amLODIPine (NORVASC) 10 MG tablet Take 1 tablet by mouth daily 90 tablet 3    Cholecalciferol (VITAMIN D) 50 MCG (2000 UT) CAPS capsule Take 2,000 Units by mouth daily      levothyroxine (SYNTHROID) 112 MCG tablet Take 112 mcg by mouth Daily      pioglitazone (ACTOS) 30 MG tablet Take 30 mg by mouth daily      Multiple Vitamins-Minerals (CENTRUM SILVER) TABS Take by mouth three times a week       glimepiride (AMARYL) 4 MG tablet Take 4 mg by mouth every morning (before breakfast)       aspirin 81 MG tablet Take 81 mg by mouth daily. No current facility-administered medications for this visit.        Lab Results:    CBC:   Lab Results   Component Value Date    WBC 3.5 (L) 06/25/2015    HGB 11.6 (L) 02/10/2022    HCT 36.9 02/10/2022    MCV 84.58 06/25/2015     06/25/2015     BMP:    Lab Results   Component Value Date     11/29/2022     (L) 07/07/2022     (L) 06/07/2022    K 4.8 11/29/2022    K 4.9 07/07/2022    K 5.0 06/11/2022     11/29/2022     07/07/2022     06/07/2022    CO2 25 11/29/2022    CO2 23 07/07/2022    CO2 24 06/07/2022    BUN 58 (H) 11/29/2022    BUN 41 (H) 07/07/2022    BUN 54 (H) 06/07/2022    CREATININE 2.49 (H) 11/29/2022    CREATININE 2.34 (H) 07/07/2022    CREATININE 2.87 (H) 06/07/2022    GLUCOSE 111 (H) 11/29/2022    GLUCOSE 357 (H) 06/07/2022    GLUCOSE 285 (H) 05/13/2022      Hepatic:   Lab Results   Component Value Date    AST 37 07/25/2018    AST 30 06/25/2015    ALT 24 07/25/2018    ALT 18 06/25/2015    BILITOT 0.8 07/25/2018    BILITOT 0.6 06/25/2015    ALKPHOS 74 07/25/2018    ALKPHOS 64 06/25/2015     BNP: No results found for: BNP  Lipids:   Lab Results   Component Value Date    CHOL 233 (H) 07/25/2018    HDL 84 07/25/2018     INR: No results found for: INR  URINE: No results found for: NAUR, PROTUR  No results found for: NITRU, COLORU, PHUR, LABCAST, WBCUA, RBCUA, MUCUS, TRICHOMONAS, YEAST, BACTERIA, CLARITYU, SPECGRAV, LEUKOCYTESUR, UROBILINOGEN, BILIRUBINUR, BLOODU, GLUCOSEU, KETUA, AMORPHOUS   Microalbumen/Creatinine ratio:  No components found for: RUCREAT    Objective:   Vitals: BP (!) 157/63 (Site: Right Upper Arm, Position: Sitting, Cuff Size: Large Adult)   Pulse 65   Wt 153 lb 6.4 oz (69.6 kg)   SpO2 100%   BMI 24.76 kg/m²      Constitutional:  Alert, awake, no apparent distress  Skin:normal with no rash or any significant lesions  HEENT:Pupils are reactive . Throat is clear. Oral mucosa is moist.  Neck:supple with no thyromegaly, JVD, lymphadenopathy or bruit   Cardiovascular: Regular sinus rhythm without murmur, rubs or gallops   Respiratory:  Clear to auscultation with no wheezes or rales  Abdomen: Good bowel sound, soft, non tender and no bruit  Ext: No LE edema  Musculoskeletal:Intact  Neuro:Alert, awake and oriented with no obvious focal deficit. Speech is normal.    Electronically signed by Tsering James MD on 12/5/2022 at 8:23 AM   **This report has been created using voice recognition software. It maycontain minor  errors which are inherent in voice recognition technology. **

## 2022-12-05 NOTE — PATIENT INSTRUCTIONS
Please check your blood pressure at home twice a day morning and evening for 5 days and call the office with the numberProtein Sources in the Diet    Diary foods  Milk, buttermilk  Yogurt  Cheese, cottage cheese, parmesan, feta, American Swiss, Mozzarella, ricotta    Meats  Beef  Pork  Poultry  Fish    Meat Alternatives  Egg  Peanut butter  Dry beans, peas. Lentils  Soy food, soy milk, tofu  Nuts and seeds (tree nuts, peanuts)    Other foods with protein  Bread  Cereals, Oatmeal  Grains (grits, flour, rice)  Starchy vegetables (Green peas, potatoes, winter squash, corn)  Popcorn  Crackers  Tortilla chips    Low Sodium Diet (2,000 Milligram/2 Gm)    The most common source of sodium is salt. People get most of the salt in their diet from canned, prepared, and packaged foods. Fast food and restaurant meals also are very high in sodium. Limit your sodium to less than 2,000 milligrams (mg) a day. This limit counts all the sodium in prepared and packaged foods and any salt you add to your food. And try to further reduce how much sodium you eat to less than 1,500 mg a day if you are 46 or older, are black, or have high blood pressure, diabetes, or chronic kidney disease. Buy low-sodium foods   Buy foods that are labeled \"unsalted\" (no salt added), \"sodium-free\" (less than 5 mg of sodium per serving), or \"low-sodium\" (less than 140 mg of sodium per serving). Foods labeled \"reduced-sodium\" and \"light sodium\" may still have too much sodium. Be sure to read the label to see how much sodium you are getting. Buy fresh vegetables, or frozen vegetables without added sauces. Buy low-sodium versions of canned vegetables, soups, and other canned goods. Prepare low-sodium meals   Cut back on the amount of salt you use in cooking. This will help you adjust to the taste. Do not add salt after cooking. One teaspoon of salt has about 2,300 mg of sodium. Take the salt shaker off the table.    Flavor your food with garlic, lemon juice, onion, vinegar, herbs, and spices. Do not use soy sauce, lite soy sauce, steak sauce, onion salt, garlic salt, celery salt, mustard, or ketchup on your food. Use low-sodium salad dressings, sauces, and ketchup. Or make your own salad dressings and sauces without adding salt. Use less salt (or none) when recipes call for it. You can often use half the salt a recipe calls for without losing flavor. Other foods such as rice, pasta, and grains do not need added salt. Rinse canned vegetables, and cook them in fresh water. This removes some--but not all--of the salt. Avoid high-sodium foods   Avoid eating:   Smoked, cured, salted, and canned meat, fish, and poultry. Ham, fair, hot dogs, and luncheon meats. Regular, hard, and processed cheese such as American, Smart Angles  Regular peanut butter. Crackers with salted tops, and other salted snack foods such as pretzels, chips, and salted popcorn. Frozen prepared meals, unless labeled low-sodium. Canned and dried soups, broths, and bouillon, unless labeled sodium-free or low-sodium. Canned vegetables, unless labeled sodium-free or low-sodium. Dinner kits such as hamburger and pasta meals  Frozen meal- entrees, dinners, vegetable with sauce  [place] fries, pizza, tacos, and other fast foods. Instant cooking foods to which you add hot water and stir-Potatoes, cereals, noodles  Packaged starch foods-seasoned noodle or rice dish, stuffing mix, macaroni and cheese dinner  Pickles, olives, ketchup, and other condiments, especially soy sauce, unless labeled sodium-free or low-sodium. Do not use salt substitute or \"lite\" salt because it contains potassium rather than sodium   Taiwo Lite Salt   No Salt, Nu Salt. These often are very high in potassium.      Mrs Hubert Haines is ok to use since it does not contain potassium    How to Read a Food Label to Limit Sodium: After Your Visit     Read ingredient lists on food labels   Read the list of ingredients on food labels to help you find how much sodium is in a food. The label lists the ingredients in a food in descending order (from the most to the least). If salt or sodium is high on the list, there may be a lot of sodium in the food. Know that sodium has different names. Sodium is also called monosodium glutamate (MSG, common in [facility] food), sodium citrate, sodium alginate, sodium hydroxide, and sodium phosphate. Read Nutrition Facts labels   On most foods, there is a Nutrition Facts label. This will tell you how much sodium is in one serving of food. Look at both the serving size and the sodium amount. The serving size is located at the top of the label, usually right under the \"Nutrition Facts\" title. The amount of sodium is given in the list under the title. It is given in milligrams (mg). Check the serving size carefully. A single serving is often very small, and you may eat more than one serving. If this is the case, you will eat more sodium than listed on the label. For example, if the serving size for a canned soup is 1 cup and the sodium amount is 470 mg, if you have 2 cups you will eat 940 mg of sodium. The nutrition facts for fresh fruits and vegetables are not listed on the food. They may be listed somewhere in the store. These foods usually have no sodium or low sodium. The Nutrition Facts label also gives you the Percent Daily Value for sodium. This is how much of the recommended amount of sodium a serving contains. The daily value for sodium is less than 2,300 mg. So if the Percent Daily Value says 50%, this means one serving is giving you half of this, or 1,150 mg.

## 2023-02-28 LAB
ANION GAP SERPL CALCULATED.3IONS-SCNC: 9 MMOL/L (ref 4–12)
BUN BLDV-MCNC: 45 MG/DL (ref 7–20)
CALCIUM SERPL-MCNC: 9.4 MG/DL (ref 8.8–10.5)
CHLORIDE BLD-SCNC: 106 MEQ/L (ref 101–111)
CO2: 21 MEQ/L (ref 21–32)
CREAT SERPL-MCNC: 2.4 MG/DL (ref 0.6–1.3)
CREATININE CLEARANCE: 24
CREATININE, RANDOM URINE: 81.2 MG/DL
FERRITIN: 91 NG/ML (ref 12–263)
GLUCOSE: 236 MG/DL (ref 70–110)
HCT VFR BLD CALC: 36.8 % (ref 35–44)
HEMOGLOBIN: 11.7 GM/DL (ref 12–15)
IRON SATURATION: 19 % (ref 15–50)
IRON, SERUM: 64 MCG/DL (ref 28–170)
PARATHYROID HORMONE INTACT: 38.6 U/ML (ref 12–88)
POTASSIUM SERPL-SCNC: 4 MEQ/L (ref 3.6–5)
PROTEIN, URINE, RANDOM: 286 MG/DL
PROTEIN/CREAT RATIO: 3.52 G/1.73M2
SODIUM BLD-SCNC: 136 MEQ/L (ref 135–145)
TRANSFERRIN: 236 MG/DL (ref 192–382)
VITAMIN D 25-HYDROXY: 43.4 NG/ML (ref 30–100)

## 2023-03-06 ENCOUNTER — OFFICE VISIT (OUTPATIENT)
Dept: NEPHROLOGY | Age: 78
End: 2023-03-06
Payer: MEDICARE

## 2023-03-06 VITALS
DIASTOLIC BLOOD PRESSURE: 81 MMHG | SYSTOLIC BLOOD PRESSURE: 183 MMHG | BODY MASS INDEX: 24.64 KG/M2 | WEIGHT: 157 LBS | OXYGEN SATURATION: 100 % | HEART RATE: 78 BPM | HEIGHT: 67 IN

## 2023-03-06 DIAGNOSIS — N18.32 STAGE 3B CHRONIC KIDNEY DISEASE (HCC): Primary | ICD-10-CM

## 2023-03-06 DIAGNOSIS — N25.81 HYPERPARATHYROIDISM, SECONDARY RENAL (HCC): ICD-10-CM

## 2023-03-06 DIAGNOSIS — I10 PRIMARY HYPERTENSION: ICD-10-CM

## 2023-03-06 DIAGNOSIS — E11.21 DIABETIC NEPHROPATHY ASSOCIATED WITH TYPE 2 DIABETES MELLITUS (HCC): ICD-10-CM

## 2023-03-06 PROCEDURE — 1123F ACP DISCUSS/DSCN MKR DOCD: CPT | Performed by: INTERNAL MEDICINE

## 2023-03-06 PROCEDURE — 99213 OFFICE O/P EST LOW 20 MIN: CPT | Performed by: INTERNAL MEDICINE

## 2023-03-06 PROCEDURE — 3079F DIAST BP 80-89 MM HG: CPT | Performed by: INTERNAL MEDICINE

## 2023-03-06 PROCEDURE — 3077F SYST BP >= 140 MM HG: CPT | Performed by: INTERNAL MEDICINE

## 2023-03-06 NOTE — PROGRESS NOTES
Renal Progress Note    Assessment and Plan:      Diagnosis Orders   1. Stage 3b chronic [facility] disease ([facility]        2. Primary hypertension        3. Diabetic nephropathy associated with type 2 diabetes mellitus ([facility]        4. Hyperparathyroidism, secondary renal ([facility]                  PLAN:  Reviewed labs with the patient   She understood well  Addressed her questions   Serum creatinine is improved to 2.4 mg/dl from 2.49 mg/dl  Urine protein/creatinine ratio is increased to 3.52 gm from 1.25 gm  Monitor blood pressure at home two to three times a day for 3 to 5 days and call us with the report   Medications reviewed   No changes   Return visit in 4 months with labs           Patient Active Problem List   Diagnosis    Acute renal failure ([facility]    CKD (chronic kidney disease), stage III ([facility]    HTN (hypertension)    Hypothyroidism    HLD (hyperlipidemia)    Anemia    DM type 2 (diabetes mellitus, type 2) ([facility]    Vitamin D deficiency    Type 2 diabetes mellitus with stage 3 chronic kidney disease ([facility]           Subjective:   Chief complaint:  Chief Complaint   Patient presents with    Chronic Kidney Disease     iv      HPI:This is a follow up visit for Ms Gabriel Zarco here today for a follow up appointment . Sees her for chronic kidney disease among other things . Was last seen about 3 months ago and doing well since then with no issues of concern. Blood pressure is high in the office today but normal at home . Denies any chest pain,shortness of breath,nausea ,vomiting ,fever or chills. ROS:  Pertinent positives stated above in HPI. All other systems were reviewed and were negative.   Medications:     Current Outpatient Medications   Medication Sig Dispense Refill    sAXagliptin (ONGLYZA) 2.5 MG TABS tablet Take 2.5 mg by mouth daily      amLODIPine (NORVASC) 10 MG tablet Take 1 tablet by mouth daily 90 tablet 3    Cholecalciferol (VITAMIN D) 50 MCG (2000 UT) CAPS capsule Take 2,000 Units by mouth daily levothyroxine (SYNTHROID) 112 MCG tablet Take 112 mcg by mouth Daily      pioglitazone (ACTOS) 30 MG tablet Take 30 mg by mouth daily      Multiple Vitamins-Minerals (CENTRUM SILVER) TABS Take by mouth three times a week       glimepiride (AMARYL) 4 MG tablet Take 4 mg by mouth every morning (before breakfast)       aspirin 81 MG tablet Take 81 mg by mouth daily. No current facility-administered medications for this visit.        Lab Results:    CBC:   Lab Results   Component Value Date    WBC 3.5 (L) 06/25/2015    HGB 11.7 (L) 02/28/2023    HCT 36.8 02/28/2023    MCV 84.58 06/25/2015     06/25/2015     BMP:    Lab Results   Component Value Date     02/28/2023     11/29/2022     (L) 07/07/2022    K 4.0 02/28/2023    K 4.8 11/29/2022    K 4.9 07/07/2022     02/28/2023     11/29/2022     07/07/2022    CO2 21 02/28/2023    CO2 25 11/29/2022    CO2 23 07/07/2022    BUN 45 (H) 02/28/2023    BUN 58 (H) 11/29/2022    BUN 41 (H) 07/07/2022    CREATININE 2.40 (H) 02/28/2023    CREATININE 2.49 (H) 11/29/2022    CREATININE 2.34 (H) 07/07/2022    GLUCOSE 236 (H) 02/28/2023    GLUCOSE 111 (H) 11/29/2022    GLUCOSE 357 (H) 06/07/2022      Hepatic:   Lab Results   Component Value Date    AST 37 07/25/2018    AST 30 06/25/2015    ALT 24 07/25/2018    ALT 18 06/25/2015    BILITOT 0.8 07/25/2018    BILITOT 0.6 06/25/2015    ALKPHOS 74 07/25/2018    ALKPHOS 64 06/25/2015     BNP: No results found for: BNP  Lipids:   Lab Results   Component Value Date    CHOL 233 (H) 07/25/2018    HDL 84 07/25/2018     INR: No results found for: INR  URINE: No results found for: NAUR, PROTUR  No results found for: NITRU, COLORU, PHUR, LABCAST, WBCUA, RBCUA, MUCUS, TRICHOMONAS, YEAST, BACTERIA, CLARITYU, SPECGRAV, LEUKOCYTESUR, UROBILINOGEN, BILIRUBINUR, BLOODU, GLUCOSEU, KETUA, AMORPHOUS   Microalbumen/Creatinine ratio:  No components found for: RUCREAT    Objective:   Vitals: BP (!) 183/81 (Site: Right Upper Arm, Position: Sitting, Cuff Size: Small Adult)   Pulse 78   Ht 5' 6.5\" (1.689 m)   Wt 157 lb (71.2 kg)   SpO2 100%   BMI 24.96 kg/m²      Constitutional:  Alert, awake, no apparent distress  Skin:normal with no rash or any significant lesions  HEENT:Pupils are reactive . Throat is clear. Oral mucosa is moist.  Neck:supple with no thyromegaly, JVD, lymphadenopathy or bruit **  Cardiovascular: Regular sinus rhythm without murmur, rubs or gallops   Respiratory:  Clear to auscultation with no wheezes or rales  Abdomen: Good bowel sound, soft, non tender and no bruit  Ext: No LE edema  Musculoskeletal:Intact  Neuro:Alert, awake and oriented with no obvious focal deficit. Speech is normal.**    Electronically signed by Marita Gastelum MD on 3/6/2023 at 11:07 AM   **This report has been created using voice recognition software. It maycontain minor  errors which are inherent in voice recognition technology. **

## 2023-03-06 NOTE — PATIENT INSTRUCTIONS
Drugs to Avoid with Chronic Kidney Disease    Non-steroidal anti-inflammatory drugs (NSAIDS)    Potential complication and side effects of NSAIDS include:  Kidney injury and worsening kidney function   Risk of stomach ulcer and intestinal bleeding  Fluid retention and edema  Increased Blood Pressure    Non-Steroidal Anti-Inflammatory Drugs    Celecoxib (Celebrex)  Choline and magnesium salicylates (CMT, Trisosal, Trilisate)  Choline salicylate (Arthropan)  Diclofenac (Voltaren, Arthrotec, Cataflam, Cambia, Voltaren-XR, Zipsor)  Diflunisal (Dolobid)  Etodolac (Lodine XL, Lodine)  Famotidine + Ibuprofen (Duexis)  Fenoprofen (Nalfon, Nalfon 200)  Furbiprofen (Asaid)  Ibuprofen (Advil, Motrin, Midol, Nuprin, Genpril, Dolgesic,Profen,, Vicoprofen,Combunox, Actiprofen, Addaprin, Caldolor, Haltran, Q-Profen,Ibren, Menadol, Rufen,Saleto-200, [place],Ultraprin, Uni-Pro,Wal-Profen)  Indomethacin (Indocin, Indomethegan, Indo-Mohit)   Ketoprofen (Orudis  KT, Oruvail, Actron)  Ketorolac (Toradol, Sprix)  Magnesium Sulfate (Arthritab, Sintia Select, Norberto's pills, Ravi, Mobidin, Mobogesic)  Meclofenamate Sodium (Ponstel)  Meloxicam (Mobic)  Naproxen (Aleve, Anaprox, Naprosyn, EC-Naprosyn, Naprelan, All Day Pain Relief, Aflaxen, Anaprox-DS, Midol Extended Relief, Naprelan,Prevacid NapraPac, Naprapac, Vimovo)  Nabumetone (Relafen)  Oxaprozin (Daypro)  Piroxicam (Feldene)  Rofecoxib (Vioxx)  Salsalate (Amigesic, Anaflex 750, Disalcid, Marthritic, Mono-gesic, Salflex, Salsitab)  Sodium salicylate (various generics)  Sulindac (Clinoril)  Tolmetin (Tolectin)  Valdecoxib (Bextra)  Mefenamic Acid (Ponstel)  Famotidine and Ibuprofen (Duexis) but not famotidine by itself  Meclofenamate (Meclomen)    Antibiotics  Bactrim  Gentamycin  Tobramycin  Vancomycin  Tetracycline  Macrobid    Other                  IV contrast dyeLow Sodium Diet (2,000 Milligram/2 Gm)    The most common source of sodium is salt.  People get most of the salt in their diet from canned, prepared, and packaged foods. Fast food and restaurant meals also are very high in sodium. Limit your sodium to less than 2,000 milligrams (mg) a day. This limit counts all the sodium in prepared and packaged foods and any salt you add to your food. And try to further reduce how much sodium you eat to less than 1,500 mg a day if you are 46 or older, are black, or have high blood pressure, diabetes, or chronic kidney disease. Buy low-sodium foods   Buy foods that are labeled \"unsalted\" (no salt added), \"sodium-free\" (less than 5 mg of sodium per serving), or \"low-sodium\" (less than 140 mg of sodium per serving). Foods labeled \"reduced-sodium\" and \"light sodium\" may still have too much sodium. Be sure to read the label to see how much sodium you are getting. Buy fresh vegetables, or frozen vegetables without added sauces. Buy low-sodium versions of canned vegetables, soups, and other canned goods. Prepare low-sodium meals   Cut back on the amount of salt you use in cooking. This will help you adjust to the taste. Do not add salt after cooking. One teaspoon of salt has about 2,300 mg of sodium. Take the salt shaker off the table. Flavor your food with garlic, lemon juice, onion, vinegar, herbs, and spices. Do not use soy sauce, lite soy sauce, steak sauce, onion salt, garlic salt, celery salt, mustard, or ketchup on your food. Use low-sodium salad dressings, sauces, and ketchup. Or make your own salad dressings and sauces without adding salt. Use less salt (or none) when recipes call for it. You can often use half the salt a recipe calls for without losing flavor. Other foods such as rice, pasta, and grains do not need added salt. Rinse canned vegetables, and cook them in fresh water. This removes some--but not all--of the salt. Avoid high-sodium foods   Avoid eating:   Smoked, cured, salted, and canned meat, fish, and poultry. Ham, fair, hot dogs, and luncheon meats. Regular, hard, and processed cheese such as American, Carol Katos  Regular peanut butter. Crackers with salted tops, and other salted snack foods such as pretzels, chips, and salted popcorn. Frozen prepared meals, unless labeled low-sodium. Canned and dried soups, broths, and bouillon, unless labeled sodium-free or low-sodium. Canned vegetables, unless labeled sodium-free or low-sodium. Dinner kits such as hamburger and pasta meals  Frozen meal- entrees, dinners, vegetable with sauce  [place] fries, pizza, tacos, and other fast foods. Instant cooking foods to which you add hot water and stir-Potatoes, cereals, noodles  Packaged starch foods-seasoned noodle or rice dish, stuffing mix, macaroni and cheese dinner  Pickles, olives, ketchup, and other condiments, especially soy sauce, unless labeled sodium-free or low-sodium. Do not use salt substitute or \"lite\" salt because it contains potassium rather than sodium   Taiwo Lite Salt   No Salt, Nu Salt. These often are very high in potassium. Mrs Jannet Fajardo is ok to use since it does not contain potassium    How to Read a Food Label to Limit Sodium: After Your Visit     Read ingredient lists on food labels   Read the list of ingredients on food labels to help you find how much sodium is in a food. The label lists the ingredients in a food in descending order (from the most to the least). If salt or sodium is high on the list, there may be a lot of sodium in the food. Know that sodium has different names. Sodium is also called monosodium glutamate (MSG, common in [facility] food), sodium citrate, sodium alginate, sodium hydroxide, and sodium phosphate. Read Nutrition Facts labels   On most foods, there is a Nutrition Facts label. This will tell you how much sodium is in one serving of food. Look at both the serving size and the sodium amount. The serving size is located at the top of the label, usually right under the \"Nutrition Facts\" title.  The amount of sodium is given in the list under the title. It is given in milligrams (mg). Check the serving size carefully. A single serving is often very small, and you may eat more than one serving. If this is the case, you will eat more sodium than listed on the label. For example, if the serving size for a canned soup is 1 cup and the sodium amount is 470 mg, if you have 2 cups you will eat 940 mg of sodium. The nutrition facts for fresh fruits and vegetables are not listed on the food. They may be listed somewhere in the store. These foods usually have no sodium or low sodium. The Nutrition Facts label also gives you the Percent Daily Value for sodium. This is how much of the recommended amount of sodium a serving contains. The daily value for sodium is less than 2,300 mg. So if the Percent Daily Value says 50%, this means one serving is giving you half of this, or 1,150 mg. Please check your blood pressure at home at least 2-3 times a day for the next 5 to 7 days and call us with a report.

## 2023-03-24 DIAGNOSIS — N18.4 CKD (CHRONIC KIDNEY DISEASE), STAGE IV (HCC): Primary | ICD-10-CM

## 2023-03-24 RX ORDER — LOSARTAN POTASSIUM 50 MG/1
50 TABLET ORAL DAILY
Qty: 90 TABLET | Refills: 1 | Status: SHIPPED | OUTPATIENT
Start: 2023-03-24

## 2023-03-24 NOTE — TELEPHONE ENCOUNTER
Blood pressure is higher than I want. We will add  losartan 50 mg once a day. I need a BMP in about 2 weeks after starting the losartan.

## 2023-05-01 ENCOUNTER — TELEPHONE (OUTPATIENT)
Dept: NEPHROLOGY | Age: 78
End: 2023-05-01

## 2023-08-24 LAB
ANION GAP SERPL CALCULATED.3IONS-SCNC: 8 MMOL/L (ref 4–12)
BUN BLDV-MCNC: 53 MG/DL (ref 7–20)
CALCIUM SERPL-MCNC: 8.6 MG/DL (ref 8.8–10.5)
CHLORIDE BLD-SCNC: 105 MEQ/L (ref 101–111)
CO2: 21 MEQ/L (ref 21–32)
CREAT SERPL-MCNC: 3.23 MG/DL (ref 0.6–1.3)
CREATININE CLEARANCE: 17
GLUCOSE: 283 MG/DL (ref 70–110)
PARATHYROID HORMONE INTACT: 135.9 U/ML (ref 12–88)
POTASSIUM SERPL-SCNC: 4.7 MEQ/L (ref 3.6–5)
SODIUM BLD-SCNC: 134 MEQ/L (ref 135–145)
VITAMIN D 25-HYDROXY: 26.1 NG/ML (ref 30–100)

## 2023-08-28 LAB
CREATININE, RANDOM URINE: 109.6 MG/DL
PROTEIN, URINE, RANDOM: 228 MG/DL
PROTEIN/CREAT RATIO: 2.08 G/1.73M2

## 2023-08-31 ENCOUNTER — OFFICE VISIT (OUTPATIENT)
Dept: NEPHROLOGY | Age: 78
End: 2023-08-31
Payer: MEDICARE

## 2023-08-31 VITALS
HEIGHT: 66 IN | BODY MASS INDEX: 25.23 KG/M2 | OXYGEN SATURATION: 99 % | WEIGHT: 157 LBS | HEART RATE: 74 BPM | DIASTOLIC BLOOD PRESSURE: 76 MMHG | SYSTOLIC BLOOD PRESSURE: 147 MMHG

## 2023-08-31 DIAGNOSIS — I10 PRIMARY HYPERTENSION: ICD-10-CM

## 2023-08-31 DIAGNOSIS — N04.9 NEPHROTIC SYNDROME: ICD-10-CM

## 2023-08-31 DIAGNOSIS — N25.81 HYPERPARATHYROIDISM, SECONDARY RENAL (HCC): ICD-10-CM

## 2023-08-31 DIAGNOSIS — E55.9 VITAMIN D DEFICIENCY: ICD-10-CM

## 2023-08-31 DIAGNOSIS — E11.21 DIABETIC NEPHROPATHY ASSOCIATED WITH TYPE 2 DIABETES MELLITUS (HCC): ICD-10-CM

## 2023-08-31 DIAGNOSIS — E87.1 HYPONATREMIA: ICD-10-CM

## 2023-08-31 DIAGNOSIS — R80.9 PROTEINURIA, UNSPECIFIED TYPE: ICD-10-CM

## 2023-08-31 DIAGNOSIS — N18.4 CKD (CHRONIC KIDNEY DISEASE), STAGE IV (HCC): Primary | ICD-10-CM

## 2023-08-31 PROCEDURE — 1123F ACP DISCUSS/DSCN MKR DOCD: CPT | Performed by: INTERNAL MEDICINE

## 2023-08-31 PROCEDURE — 3078F DIAST BP <80 MM HG: CPT | Performed by: INTERNAL MEDICINE

## 2023-08-31 PROCEDURE — 99214 OFFICE O/P EST MOD 30 MIN: CPT | Performed by: INTERNAL MEDICINE

## 2023-08-31 PROCEDURE — 3077F SYST BP >= 140 MM HG: CPT | Performed by: INTERNAL MEDICINE

## 2023-08-31 RX ORDER — MULTIVIT-MIN/IRON/FOLIC ACID/K 18-600-40
2000 CAPSULE ORAL 2 TIMES DAILY
Qty: 60 CAPSULE | Refills: 3 | Status: SHIPPED | OUTPATIENT
Start: 2023-08-31

## 2023-09-21 LAB
ANION GAP SERPL CALCULATED.3IONS-SCNC: 7 MMOL/L (ref 4–12)
BUN BLDV-MCNC: 50 MG/DL (ref 7–20)
CALCIUM SERPL-MCNC: 9.3 MG/DL (ref 8.8–10.5)
CHLORIDE BLD-SCNC: 105 MEQ/L (ref 101–111)
CO2: 23 MEQ/L (ref 21–32)
CREAT SERPL-MCNC: 2.9 MG/DL (ref 0.6–1.3)
CREATININE CLEARANCE: 19
GLUCOSE: 259 MG/DL (ref 70–110)
POTASSIUM SERPL-SCNC: 5 MEQ/L (ref 3.6–5)
SODIUM BLD-SCNC: 135 MEQ/L (ref 135–145)

## 2023-09-22 LAB
DOUBLE STRANDED DNA AB, IGG: 0.8 IU/ML
ENA ANTIBODIES SCREEN: 0.1 RATIO

## 2023-09-25 LAB
COMPLEMENT C3: 125 MG/DL (ref 75–175)
COMPLEMENT C4: 53 MG/DL (ref 14–40)
FREE LIGHT CHAINS,SERUM: ABNORMAL

## 2023-09-28 ENCOUNTER — OFFICE VISIT (OUTPATIENT)
Dept: NEPHROLOGY | Age: 78
End: 2023-09-28
Payer: MEDICARE

## 2023-09-28 VITALS
HEART RATE: 76 BPM | HEIGHT: 66 IN | OXYGEN SATURATION: 100 % | DIASTOLIC BLOOD PRESSURE: 86 MMHG | WEIGHT: 161.6 LBS | SYSTOLIC BLOOD PRESSURE: 188 MMHG | BODY MASS INDEX: 25.97 KG/M2

## 2023-09-28 DIAGNOSIS — I10 PRIMARY HYPERTENSION: ICD-10-CM

## 2023-09-28 DIAGNOSIS — N18.4 CKD (CHRONIC KIDNEY DISEASE), STAGE IV (HCC): Primary | ICD-10-CM

## 2023-09-28 DIAGNOSIS — E11.21 DIABETIC NEPHROPATHY ASSOCIATED WITH TYPE 2 DIABETES MELLITUS (HCC): ICD-10-CM

## 2023-09-28 DIAGNOSIS — N25.81 HYPERPARATHYROIDISM, SECONDARY RENAL (HCC): ICD-10-CM

## 2023-09-28 DIAGNOSIS — R80.9 PROTEINURIA, UNSPECIFIED TYPE: ICD-10-CM

## 2023-09-28 DIAGNOSIS — E55.9 VITAMIN D DEFICIENCY: ICD-10-CM

## 2023-09-28 PROCEDURE — 3077F SYST BP >= 140 MM HG: CPT | Performed by: INTERNAL MEDICINE

## 2023-09-28 PROCEDURE — 1123F ACP DISCUSS/DSCN MKR DOCD: CPT | Performed by: INTERNAL MEDICINE

## 2023-09-28 PROCEDURE — 3079F DIAST BP 80-89 MM HG: CPT | Performed by: INTERNAL MEDICINE

## 2023-09-28 PROCEDURE — 99213 OFFICE O/P EST LOW 20 MIN: CPT | Performed by: INTERNAL MEDICINE

## 2023-09-28 NOTE — PROGRESS NOTES
Renal Progress Note    Assessment and Plan:      Diagnosis Orders   1. CKD (chronic kidney disease), stage IV ([address])        2. Diabetic nephropathy associated with type 2 diabetes mellitus ([address])        3. Primary hypertension        4. Proteinuria, unspecified type        5. Hyperparathyroidism, secondary renal ([address])        6. Vitamin D deficiency                  PLAN:  Lab result reviewed with the patient together in epic  She understood  I addressed her questions  Serum creatinine is improved to 2.90 mg per visit 23.23 mg/dL. Medications reviewed  No changes  Will refer to be educated on options in end-stage kidney disease since  she is stage IV though I do not believe anything will happen anytime soon. I discussed that with her  Return visit in 3 months with lab          Patient Active Problem List   Diagnosis    Acute renal failure ([address])    CKD (chronic kidney disease), stage III ([facility])    HTN (hypertension)    Hypothyroidism    HLD (hyperlipidemia)    Anemia    DM type 2 (diabetes mellitus, type 2) ([facility])    Vitamin D deficiency    Type 2 diabetes mellitus with stage 3 chronic kidney disease ([address])           Subjective:   Chief complaint:  Chief Complaint   Patient presents with    Follow-up     FU 1 month      HPI:This is a follow up visit for Ms. Lino Cook who is here today for return appointment. We see her for chronic kidney disease among other things. She was last seen about 4 weeks ago. At that time she was noted to have  worsening kidney function with serum creatinine going up from a baseline of 2.3 to 2.8 mg/dL up to 3.23 mg/dL. Possibility of that resulting from saxagliptin was discussed with her. Vitamin D3 was also low. Vitamin D intake was increased from 2000 international units a day to twice a day. She has no specific complaint today. Appetite is good. Denies nausea or vomiting. No lower extremity edema. Denies chest pain or shortness of breath.     ROS:  Pertinent positives stated

## 2023-12-28 LAB
ANION GAP SERPL CALCULATED.3IONS-SCNC: ABNORMAL MMOL/L (ref 4–12)
BUN BLDV-MCNC: 38 MG/DL (ref 7–20)
CALCIUM SERPL-MCNC: 9.9 MG/DL (ref 8.8–10.5)
CHLORIDE BLD-SCNC: 107 MEQ/L (ref 101–111)
CO2: 20 MEQ/L (ref 21–32)
CREAT SERPL-MCNC: 2.74 MG/DL (ref 0.6–1.3)
CREATININE CLEARANCE: 20
CREATININE, RANDOM URINE: 84.2 MG/DL
GLUCOSE: 132 MG/DL (ref 70–110)
PARATHYROID HORMONE INTACT: 101.9 U/ML (ref 12–88)
POTASSIUM SERPL-SCNC: 4.7 MEQ/L (ref 3.6–5)
PROTEIN, URINE, RANDOM: 216 MG/DL
PROTEIN/CREAT RATIO: 2.57 G/1.73M2
SODIUM BLD-SCNC: 138 MEQ/L (ref 135–145)
VITAMIN D 25-HYDROXY: 37.8 NG/ML (ref 30–100)

## 2024-01-04 ENCOUNTER — OFFICE VISIT (OUTPATIENT)
Dept: NEPHROLOGY | Age: 79
End: 2024-01-04
Payer: MEDICARE

## 2024-01-04 VITALS
WEIGHT: 158 LBS | HEART RATE: 60 BPM | DIASTOLIC BLOOD PRESSURE: 78 MMHG | BODY MASS INDEX: 25.39 KG/M2 | SYSTOLIC BLOOD PRESSURE: 208 MMHG | OXYGEN SATURATION: 98 % | HEIGHT: 66 IN

## 2024-01-04 DIAGNOSIS — I10 ESSENTIAL HYPERTENSION: ICD-10-CM

## 2024-01-04 DIAGNOSIS — R80.9 PROTEINURIA, UNSPECIFIED TYPE: ICD-10-CM

## 2024-01-04 DIAGNOSIS — N25.81 HYPERPARATHYROIDISM, SECONDARY RENAL (HCC): ICD-10-CM

## 2024-01-04 DIAGNOSIS — E08.21 DIABETIC NEPHROPATHY ASSOCIATED WITH DIABETES MELLITUS DUE TO UNDERLYING CONDITION (HCC): ICD-10-CM

## 2024-01-04 DIAGNOSIS — N18.4 CKD (CHRONIC KIDNEY DISEASE), STAGE IV (HCC): Primary | ICD-10-CM

## 2024-01-04 DIAGNOSIS — E11.21 DIABETIC NEPHROPATHY ASSOCIATED WITH TYPE 2 DIABETES MELLITUS (HCC): ICD-10-CM

## 2024-01-04 PROCEDURE — 99214 OFFICE O/P EST MOD 30 MIN: CPT | Performed by: INTERNAL MEDICINE

## 2024-01-04 PROCEDURE — 1123F ACP DISCUSS/DSCN MKR DOCD: CPT | Performed by: INTERNAL MEDICINE

## 2024-01-04 PROCEDURE — 3077F SYST BP >= 140 MM HG: CPT | Performed by: INTERNAL MEDICINE

## 2024-01-04 PROCEDURE — 3078F DIAST BP <80 MM HG: CPT | Performed by: INTERNAL MEDICINE

## 2024-01-04 NOTE — PROGRESS NOTES
138/62.      .    ROS:  Pertinent positives stated above in HPI. All other systems were reviewed and were negative.  Medications:     Current Outpatient Medications   Medication Sig Dispense Refill    Loratadine-Pseudoephedrine (CLARITIN-D 12 HOUR PO) Take by mouth daily      Cholecalciferol (VITAMIN D) 50 MCG (2000 UT) CAPS capsule Take 2,000 Units by mouth 2 times daily 60 capsule 3    sAXagliptin (ONGLYZA) 2.5 MG TABS tablet Take 1 tablet by mouth daily      amLODIPine (NORVASC) 10 MG tablet Take 1 tablet by mouth daily 90 tablet 3    levothyroxine (SYNTHROID) 112 MCG tablet Take 1 tablet by mouth Daily      Multiple Vitamins-Minerals (CENTRUM SILVER) TABS Take by mouth three times a week       glimepiride (AMARYL) 4 MG tablet Take 1 tablet by mouth every morning (before breakfast)      aspirin 81 MG tablet Take 1 tablet by mouth daily       No current facility-administered medications for this visit.       Lab Results:    CBC:   Lab Results   Component Value Date    WBC 3.5 (L) 06/25/2015    HGB 11.7 (L) 02/28/2023    HCT 36.8 02/28/2023    MCV 84.58 06/25/2015     06/25/2015     BMP:    Lab Results   Component Value Date     12/28/2023     09/21/2023     (L) 08/24/2023    K 4.7 12/28/2023    K 5.0 09/21/2023    K 4.7 08/24/2023     12/28/2023     09/21/2023     08/24/2023    CO2 20 (L) 12/28/2023    CO2 23 09/21/2023    CO2 21 08/24/2023    BUN 38 (H) 12/28/2023    BUN 50 (H) 09/21/2023    BUN 53 (H) 08/24/2023    CREATININE 2.74 (H) 12/28/2023    CREATININE 2.90 (H) 09/21/2023    CREATININE 3.23 (H) 08/24/2023    GLUCOSE 132 (H) 12/28/2023    GLUCOSE 259 (H) 09/21/2023    GLUCOSE 283 (H) 08/24/2023      Hepatic:   Lab Results   Component Value Date    AST 37 07/25/2018    AST 30 06/25/2015    ALT 24 07/25/2018    ALT 18 06/25/2015    BILITOT 0.8 07/25/2018    BILITOT 0.6 06/25/2015    ALKPHOS 74 07/25/2018    ALKPHOS 64 06/25/2015     BNP: No results found for:

## 2024-03-29 LAB
ANION GAP SERPL CALCULATED.3IONS-SCNC: 6 MMOL/L (ref 4–12)
BUN BLDV-MCNC: 40 MG/DL (ref 7–20)
CALCIUM SERPL-MCNC: 9.2 MG/DL (ref 8.8–10.5)
CHLORIDE BLD-SCNC: 106 MEQ/L (ref 101–111)
CO2: 26 MEQ/L (ref 21–32)
CREAT SERPL-MCNC: 2.48 MG/DL (ref 0.6–1.3)
CREATININE CLEARANCE: 23
CREATININE, RANDOM URINE: 63.4 MG/DL
GLUCOSE: 116 MG/DL (ref 70–110)
PARATHYROID HORMONE INTACT: 94 U/ML (ref 12–88)
POTASSIUM SERPL-SCNC: 4.3 MEQ/L (ref 3.6–5)
PROTEIN, URINE, RANDOM: 370 MG/DL
PROTEIN/CREAT RATIO: 5.84 G/1.73M2
SODIUM BLD-SCNC: 138 MEQ/L (ref 135–145)
VITAMIN D 25-HYDROXY: 43.2 NG/ML (ref 30–100)

## 2024-04-04 ENCOUNTER — OFFICE VISIT (OUTPATIENT)
Dept: NEPHROLOGY | Age: 79
End: 2024-04-04
Payer: MEDICARE

## 2024-04-04 VITALS
BODY MASS INDEX: 25.55 KG/M2 | HEIGHT: 66 IN | WEIGHT: 159 LBS | SYSTOLIC BLOOD PRESSURE: 182 MMHG | HEART RATE: 75 BPM | OXYGEN SATURATION: 98 % | DIASTOLIC BLOOD PRESSURE: 80 MMHG

## 2024-04-04 DIAGNOSIS — N18.32 STAGE 3B CHRONIC KIDNEY DISEASE (HCC): Primary | ICD-10-CM

## 2024-04-04 DIAGNOSIS — N04.9 NEPHROTIC SYNDROME: ICD-10-CM

## 2024-04-04 DIAGNOSIS — E11.21 DIABETIC NEPHROPATHY ASSOCIATED WITH TYPE 2 DIABETES MELLITUS (HCC): ICD-10-CM

## 2024-04-04 DIAGNOSIS — N25.81 HYPERPARATHYROIDISM, SECONDARY RENAL (HCC): ICD-10-CM

## 2024-04-04 PROCEDURE — 99214 OFFICE O/P EST MOD 30 MIN: CPT | Performed by: INTERNAL MEDICINE

## 2024-04-04 PROCEDURE — 1123F ACP DISCUSS/DSCN MKR DOCD: CPT | Performed by: INTERNAL MEDICINE

## 2024-04-04 PROCEDURE — 3078F DIAST BP <80 MM HG: CPT | Performed by: INTERNAL MEDICINE

## 2024-04-04 PROCEDURE — 3077F SYST BP >= 140 MM HG: CPT | Performed by: INTERNAL MEDICINE

## 2024-04-04 RX ORDER — LOSARTAN POTASSIUM 100 MG/1
100 TABLET ORAL DAILY
Qty: 30 TABLET | Refills: 5 | Status: SHIPPED | OUTPATIENT
Start: 2024-04-04

## 2024-04-04 NOTE — PROGRESS NOTES
Renal Progress Note    Assessment and Plan:      Diagnosis Orders   1. Stage 3b chronic kidney disease (HCC)        2. Nephrotic syndrome        3. Hyperparathyroidism, secondary renal (HCC)        4. Diabetic nephropathy associated with type 2 diabetes mellitus (HCC)                  PLAN:  Lab result reviewed with the patient today during epic  She understood well being a retired   I addressed her questions  Serum creatinine is improved to 2.48 mg/dL from 2.74 mg/dL.  In December 2023  Serum bicarbonate is improved to 26 mEq/L from 20 mEq/L.  PTH is improved to 94.0 from around 101.9.  Vitamin D level is good at 43.20.  Urine protein creatinine ratio is increased to 5.84 g from 2.57 g.  Medications reviewed  Will add losartan 100 mg a day for both better blood pressure control and for the proteinuria  This was discussed with the patient  Continue low protein diet  Return visit in 3 months with labs      HISTORY  This is a follow-up visit for Ms. Kateryan Mejia here today for return appointment.  I see her for chronic kidney disease among other things.  She was last seen about 3 months ago.  Doing relatively well with no new issues of concern.  Urinates well.  Appetite is good.  Denies any chest pain or shortness of breath.  No difficulties with urination.      Physical Exam  -------------------  Well-developed elderly lady in no distress.  Vital signs are stable.  Skin exam is normal.  HEENT head is normal.  Pupils are reactive.  Throat is clear.  Neck is supple without bruit adenopathy.  Cardiac exam regular sinus rhythm with no murmurs or rubs.  Lungs are clear to auscultation with no wheezes rhonchi or rales.  Abdomen is soft.  Good bowel sounds.  Nontender.  No abdominal bruit.  Extremities no pitting edema.  Rectal exam was not done.  CNS examination reveals a well-developed elderly lady with no obvious focal deficit.

## 2024-07-10 DIAGNOSIS — E55.9 VITAMIN D DEFICIENCY: ICD-10-CM

## 2024-07-10 DIAGNOSIS — N18.32 STAGE 3B CHRONIC KIDNEY DISEASE (HCC): Primary | ICD-10-CM

## 2024-07-10 DIAGNOSIS — R80.9 PROTEINURIA, UNSPECIFIED TYPE: ICD-10-CM

## 2024-07-10 DIAGNOSIS — N18.4 CKD (CHRONIC KIDNEY DISEASE), STAGE IV (HCC): ICD-10-CM

## 2024-07-10 LAB
ANION GAP SERPL CALCULATED.3IONS-SCNC: 6 MMOL/L (ref 4–12)
BUN BLDV-MCNC: 46 MG/DL (ref 7–20)
CALCIUM SERPL-MCNC: 8.9 MG/DL (ref 8.8–10.5)
CHLORIDE BLD-SCNC: 103 MEQ/L (ref 101–111)
CO2: 23 MEQ/L (ref 21–32)
CREAT SERPL-MCNC: 3 MG/DL (ref 0.6–1.3)
CREATININE CLEARANCE: 18
GLUCOSE: 332 MG/DL (ref 70–110)
POTASSIUM SERPL-SCNC: 4.7 MEQ/L (ref 3.6–5)
PTH INTACT: 101.5 U/ML (ref 12–88)
SODIUM BLD-SCNC: 132 MEQ/L (ref 135–145)
VITAMIN D 25-HYDROXY: 41 NG/ML (ref 30–100)

## 2024-07-11 ENCOUNTER — OFFICE VISIT (OUTPATIENT)
Dept: NEPHROLOGY | Age: 79
End: 2024-07-11
Payer: MEDICARE

## 2024-07-11 VITALS
HEART RATE: 80 BPM | BODY MASS INDEX: 24.75 KG/M2 | WEIGHT: 154 LBS | HEIGHT: 66 IN | OXYGEN SATURATION: 100 % | DIASTOLIC BLOOD PRESSURE: 73 MMHG | SYSTOLIC BLOOD PRESSURE: 152 MMHG

## 2024-07-11 DIAGNOSIS — I10 PRIMARY HYPERTENSION: ICD-10-CM

## 2024-07-11 DIAGNOSIS — N18.4 CKD (CHRONIC KIDNEY DISEASE), STAGE IV (HCC): Primary | ICD-10-CM

## 2024-07-11 DIAGNOSIS — N25.81 HYPERPARATHYROIDISM, SECONDARY RENAL (HCC): ICD-10-CM

## 2024-07-11 DIAGNOSIS — E87.1 HYPONATREMIA: ICD-10-CM

## 2024-07-11 DIAGNOSIS — E55.9 VITAMIN D DEFICIENCY: ICD-10-CM

## 2024-07-11 DIAGNOSIS — N04.9 NEPHROTIC SYNDROME: ICD-10-CM

## 2024-07-11 DIAGNOSIS — E11.21 DIABETIC NEPHROPATHY ASSOCIATED WITH TYPE 2 DIABETES MELLITUS (HCC): ICD-10-CM

## 2024-07-11 LAB
CREATININE, RANDOM URINE: 53.6 MG/DL
PROTEIN, URINE, RANDOM: 162.3 MG/DL
PROTEIN/CREAT RATIO: 3.03 G/1.73M2

## 2024-07-11 PROCEDURE — 3078F DIAST BP <80 MM HG: CPT | Performed by: INTERNAL MEDICINE

## 2024-07-11 PROCEDURE — 99214 OFFICE O/P EST MOD 30 MIN: CPT | Performed by: INTERNAL MEDICINE

## 2024-07-11 PROCEDURE — 1123F ACP DISCUSS/DSCN MKR DOCD: CPT | Performed by: INTERNAL MEDICINE

## 2024-07-11 PROCEDURE — 3077F SYST BP >= 140 MM HG: CPT | Performed by: INTERNAL MEDICINE

## 2024-07-11 RX ORDER — AMLODIPINE BESYLATE 5 MG/1
5 TABLET ORAL DAILY
COMMUNITY

## 2024-07-11 NOTE — PROGRESS NOTES
Renal Progress Note    Assessment and Plan:      Diagnosis Orders   1. CKD (chronic kidney disease), stage IV (HCC)        2. Hyponatremia        3. Diabetic nephropathy associated with type 2 diabetes mellitus (HCC)        4. Primary hypertension        5. Hyperparathyroidism, secondary renal (HCC)        6. Nephrotic syndrome                  PLAN:  I discussed my thoughts at length with the patient.  She understood well.  I addressed her questions.  Lab result reviewed with her.  Serum creatinine is increased to 3.0 mg/dL from 2.48 mg/dL in March 2024.  However, it was 3.23 mg/L in 2023.  I discussed that with the patient.  Vitamin D level is good at 41  Medications reviewed  Will not change anything   He is on losartan already for proteinuria  I discussed a low protein diet again with her  Also discussed the importance of  blood sugar control to help her prevent Worsening kidney function  Return visit in 3 months with labs  I may consider addition of spironolactone depending on the urine protein creatinine ratio             Patient Active Problem List   Diagnosis    Acute renal failure (HCC)    CKD (chronic kidney disease), stage III (HCC)    HTN (hypertension)    Hypothyroidism    HLD (hyperlipidemia)    Anemia    DM type 2 (diabetes mellitus, type 2) (HCC)    Vitamin D deficiency    Type 2 diabetes mellitus with stage 3 chronic kidney disease (HCC)           Subjective:   Chief complaint:  Chief Complaint   Patient presents with    Follow-up     FU for CKD 3b      HPI:This is a follow up visit for Ms. Kateryna Mejia here today for return appointment.  I see her for chronic kidney disease among other multiple medical entities.  She was last seen April 2024.  Doing well with no complaint today.  No hospitalizations.  Appetite is good.  No difficulties with urination.  No chest pain or shortness of breath.      ROS:  Pertinent positives stated above in HPI. All other systems were reviewed and were

## 2024-07-11 NOTE — PROGRESS NOTES
Pt does not report changes. She fell yesterday and left knee is swollen.   Head is atraumatic. Head shape is symmetrical.

## 2024-07-12 ENCOUNTER — TELEPHONE (OUTPATIENT)
Dept: NEPHROLOGY | Age: 79
End: 2024-07-12

## 2024-07-12 NOTE — TELEPHONE ENCOUNTER
----- Message from Olvin Oh MD sent at 7/12/2024  8:33 AM EDT -----  Urine protein is much better than before  Continue what you are doing for now

## 2024-08-26 ENCOUNTER — TELEPHONE (OUTPATIENT)
Dept: NEPHROLOGY | Age: 79
End: 2024-08-26

## 2024-08-26 NOTE — TELEPHONE ENCOUNTER
Patient's PCP wants to put her on Rybelsus and patient wants to make sure that is okay? Please advise

## 2024-11-18 LAB
ANION GAP SERPL CALCULATED.3IONS-SCNC: 8 MMOL/L (ref 4–12)
BUN BLDV-MCNC: 56 MG/DL (ref 7–20)
CALCIUM SERPL-MCNC: 9.5 MG/DL (ref 8.8–10.5)
CHLORIDE BLD-SCNC: 106 MEQ/L (ref 101–111)
CO2: 20 MEQ/L (ref 21–32)
CREAT SERPL-MCNC: 3.34 MG/DL (ref 0.6–1.3)
CREATININE CLEARANCE: 16
CREATININE, RANDOM URINE: 59.5 MG/DL
GLUCOSE: 222 MG/DL (ref 70–110)
POTASSIUM SERPL-SCNC: 4.8 MEQ/L (ref 3.6–5)
PROTEIN, URINE, RANDOM: 195.1 MG/DL
PROTEIN/CREAT RATIO: 3.28 G/1.73M2
PTH INTACT: 103.9 PG/ML (ref 12–88)
SODIUM BLD-SCNC: 134 MEQ/L (ref 135–145)
VITAMIN D 25-HYDROXY: 46.3 NG/ML (ref 30–100)

## 2024-11-20 ENCOUNTER — TELEPHONE (OUTPATIENT)
Dept: NEPHROLOGY | Age: 79
End: 2024-11-20

## 2024-12-05 ENCOUNTER — OFFICE VISIT (OUTPATIENT)
Dept: NEPHROLOGY | Age: 79
End: 2024-12-05
Payer: MEDICARE

## 2024-12-05 VITALS
BODY MASS INDEX: 23.27 KG/M2 | WEIGHT: 144.8 LBS | SYSTOLIC BLOOD PRESSURE: 154 MMHG | HEIGHT: 66 IN | OXYGEN SATURATION: 97 % | DIASTOLIC BLOOD PRESSURE: 70 MMHG | HEART RATE: 71 BPM

## 2024-12-05 DIAGNOSIS — E87.20 METABOLIC ACIDOSIS: ICD-10-CM

## 2024-12-05 DIAGNOSIS — R80.9 PROTEINURIA, UNSPECIFIED TYPE: ICD-10-CM

## 2024-12-05 DIAGNOSIS — N18.4 CKD (CHRONIC KIDNEY DISEASE), STAGE IV (HCC): Primary | ICD-10-CM

## 2024-12-05 DIAGNOSIS — I10 PRIMARY HYPERTENSION: ICD-10-CM

## 2024-12-05 DIAGNOSIS — E87.1 HYPONATREMIA: ICD-10-CM

## 2024-12-05 DIAGNOSIS — N25.81 HYPERPARATHYROIDISM, SECONDARY RENAL (HCC): ICD-10-CM

## 2024-12-05 DIAGNOSIS — N04.9 NEPHROTIC SYNDROME: ICD-10-CM

## 2024-12-05 DIAGNOSIS — E11.21 DIABETIC NEPHROPATHY ASSOCIATED WITH TYPE 2 DIABETES MELLITUS (HCC): ICD-10-CM

## 2024-12-05 PROCEDURE — 3078F DIAST BP <80 MM HG: CPT | Performed by: INTERNAL MEDICINE

## 2024-12-05 PROCEDURE — 3077F SYST BP >= 140 MM HG: CPT | Performed by: INTERNAL MEDICINE

## 2024-12-05 PROCEDURE — 1123F ACP DISCUSS/DSCN MKR DOCD: CPT | Performed by: INTERNAL MEDICINE

## 2024-12-05 PROCEDURE — 99213 OFFICE O/P EST LOW 20 MIN: CPT | Performed by: INTERNAL MEDICINE

## 2024-12-05 PROCEDURE — 1159F MED LIST DOCD IN RCRD: CPT | Performed by: INTERNAL MEDICINE

## 2024-12-05 RX ORDER — ORAL SEMAGLUTIDE 3 MG/1
1 TABLET ORAL DAILY
COMMUNITY
Start: 2024-10-28

## 2024-12-05 RX ORDER — LINAGLIPTIN 5 MG/1
5 TABLET, FILM COATED ORAL DAILY
COMMUNITY
Start: 2024-10-10

## 2024-12-05 RX ORDER — AMLODIPINE BESYLATE 10 MG/1
10 TABLET ORAL DAILY
Qty: 90 TABLET | Refills: 1 | Status: SHIPPED | OUTPATIENT
Start: 2024-12-05

## 2024-12-05 NOTE — PROGRESS NOTES
Kateryna went to ER in October for low BSL. She also reports  starting Rybelsus.   
(L) 06/25/2015    HGB 11.7 (L) 02/28/2023    HCT 36.8 02/28/2023    MCV 84.58 06/25/2015     06/25/2015     BMP:    Lab Results   Component Value Date     (L) 11/18/2024     (L) 07/10/2024     03/29/2024    K 4.8 11/18/2024    K 4.7 07/10/2024    K 4.3 03/29/2024     11/18/2024     07/10/2024     03/29/2024    CO2 20 (L) 11/18/2024    CO2 23 07/10/2024    CO2 26 03/29/2024    BUN 56 (H) 11/18/2024    BUN 46 (H) 07/10/2024    BUN 40 (H) 03/29/2024    CREATININE 3.34 (H) 11/18/2024    CREATININE 3.00 (H) 07/10/2024    CREATININE 2.48 (H) 03/29/2024    GLUCOSE 222 (H) 11/18/2024    GLUCOSE 332 (H) 07/10/2024    GLUCOSE 116 (H) 03/29/2024      Hepatic:   Lab Results   Component Value Date    AST 37 07/25/2018    AST 30 06/25/2015    ALT 24 07/25/2018    ALT 18 06/25/2015    BILITOT 0.8 07/25/2018    BILITOT 0.6 06/25/2015    ALKPHOS 74 07/25/2018    ALKPHOS 64 06/25/2015     BNP: No results found for: \"BNP\"  Lipids:   Lab Results   Component Value Date    CHOL 233 (H) 07/25/2018    HDL 84 07/25/2018     INR: No results found for: \"INR\"  URINE: No results found for: \"NAUR\", \"PROTUR\"  No results found for: \"NITRU\", \"COLORU\", \"PHUR\", \"LABCAST\", \"WBCUA\", \"RBCUA\", \"MUCUS\", \"TRICHOMONAS\", \"YEAST\", \"BACTERIA\", \"CLARITYU\", \"SPECGRAV\", \"LEUKOCYTESUR\", \"UROBILINOGEN\", \"BILIRUBINUR\", \"BLOODU\", \"GLUCOSEU\", \"KETUA\", \"AMORPHOUS\"   Microalbumen/Creatinine ratio:  No components found for: \"RUCREAT\"    Objective:   Vitals:   Vitals:    12/05/24 1234   BP: (!) 154/70   Pulse: 71   SpO2: 97%           Constitutional:  Alert, awake, no apparent distress  Skin:normal with no rash or any significant lesions  HEENT:Pupils are reactive .Throat is clear.  Oral mucosa is moist.  Neck:supple with no thyromegaly, JVD, lymphadenopathy or bruit **  Cardiovascular: Regular sinus rhythm without murmur, rubs or gallops   Respiratory:  Clear to auscultation with no wheezes or rales  Abdomen: Good bowel sound,

## 2025-03-11 ENCOUNTER — RESULTS FOLLOW-UP (OUTPATIENT)
Dept: NEPHROLOGY | Age: 80
End: 2025-03-11

## 2025-03-11 DIAGNOSIS — E87.5 HYPERKALEMIA: ICD-10-CM

## 2025-03-11 DIAGNOSIS — N18.4 CKD (CHRONIC KIDNEY DISEASE), STAGE IV (HCC): Primary | ICD-10-CM

## 2025-03-11 LAB
ANION GAP SERPL CALCULATED.3IONS-SCNC: 8 MMOL/L (ref 4–12)
BUN BLDV-MCNC: 54 MG/DL (ref 7–20)
CALCIUM SERPL-MCNC: 9.3 MG/DL (ref 8.8–10.5)
CHLORIDE BLD-SCNC: 109 MEQ/L (ref 101–111)
CO2: 22 MEQ/L (ref 21–32)
CREAT SERPL-MCNC: 3.86 MG/DL (ref 0.6–1.3)
CREATININE CLEARANCE: 14
CREATININE, RANDOM URINE: 117.9 MG/DL
GLUCOSE: 122 MG/DL (ref 70–110)
MICROALBUMIN/CREAT 24H UR: 1177 MG/L
MICROALBUMIN/CREAT UR-RTO: 998.1 MG/GM (ref 0–30)
POTASSIUM SERPL-SCNC: 5.7 MEQ/L (ref 3.6–5)
PROTEIN, URINE, RANDOM: 189.9 MG/DL
PROTEIN/CREAT RATIO: 1.61 G/1.73M2
PTH INTACT: 131.9 PG/ML (ref 12–88)
SODIUM BLD-SCNC: 139 MEQ/L (ref 135–145)
VITAMIN D 25-HYDROXY: 52.6 NG/ML (ref 30–100)

## 2025-03-11 NOTE — TELEPHONE ENCOUNTER
Spoke with Kateryna. She expresses understanding. She will come to the office today before 5 pm to  samples and repeat lab order. She will repeat K lab Friday. Sample script pending.

## 2025-03-11 NOTE — TELEPHONE ENCOUNTER
----- Message from Dr. Olvin Oh MD sent at 3/11/2025  3:36 PM EDT -----  Serum potassium is slightly high.  Zirconium cyclosilicate 5 g a day for 3 days.  Repeat potassium level on day 4..

## 2025-03-12 ENCOUNTER — TELEPHONE (OUTPATIENT)
Dept: NEPHROLOGY | Age: 80
End: 2025-03-12

## 2025-03-12 NOTE — TELEPHONE ENCOUNTER
Olvin Oh MD  [facility] Clinical Staff  Serum potassium is slightly high.  Zirconium cyclosilicate 5 g a day for 3 days.  Repeat potassium level on day 4..   03/11/2025 - Results: [facility], Lab In [facility] and others  (Newest Message First)  Olvin Oh MD to Me       3/12/25  7:17 AM   Noted    3/11/25  4:33 PM  You routed this conversation to Olvin Oh MD  [facility]    3/11/25  4:32 PM  Note      Spoke with Kateryna. She expresses understanding. She will come to the office today before 5 pm to  samples and repeat lab order. She will repeat K lab Friday. Sample script pending.

## 2025-03-14 LAB — POTASSIUM SERPL-SCNC: 4.5 MEQ/L (ref 3.6–5)

## 2025-04-01 ENCOUNTER — OFFICE VISIT (OUTPATIENT)
Dept: NEPHROLOGY | Age: 80
End: 2025-04-01
Payer: MEDICARE

## 2025-04-01 VITALS
HEIGHT: 66 IN | OXYGEN SATURATION: 99 % | DIASTOLIC BLOOD PRESSURE: 68 MMHG | HEART RATE: 65 BPM | SYSTOLIC BLOOD PRESSURE: 142 MMHG | BODY MASS INDEX: 23.14 KG/M2 | WEIGHT: 144 LBS

## 2025-04-01 DIAGNOSIS — I10 PRIMARY HYPERTENSION: ICD-10-CM

## 2025-04-01 DIAGNOSIS — E88.09 HYPERPROTEINEMIA: ICD-10-CM

## 2025-04-01 DIAGNOSIS — E55.9 VITAMIN D DEFICIENCY: ICD-10-CM

## 2025-04-01 DIAGNOSIS — E11.21 DIABETIC NEPHROPATHY ASSOCIATED WITH TYPE 2 DIABETES MELLITUS: ICD-10-CM

## 2025-04-01 DIAGNOSIS — E87.5 HYPERKALEMIA: ICD-10-CM

## 2025-04-01 DIAGNOSIS — N18.5 CKD (CHRONIC KIDNEY DISEASE) STAGE 5, GFR LESS THAN 15 ML/MIN (HCC): Primary | ICD-10-CM

## 2025-04-01 DIAGNOSIS — N25.81 HYPERPARATHYROIDISM, SECONDARY RENAL: ICD-10-CM

## 2025-04-01 PROBLEM — N18.4 CKD (CHRONIC KIDNEY DISEASE), STAGE IV (HCC): Status: ACTIVE | Noted: 2025-04-01

## 2025-04-01 PROCEDURE — 3077F SYST BP >= 140 MM HG: CPT | Performed by: INTERNAL MEDICINE

## 2025-04-01 PROCEDURE — 3078F DIAST BP <80 MM HG: CPT | Performed by: INTERNAL MEDICINE

## 2025-04-01 PROCEDURE — 1123F ACP DISCUSS/DSCN MKR DOCD: CPT | Performed by: INTERNAL MEDICINE

## 2025-04-01 PROCEDURE — 3044F HG A1C LEVEL LT 7.0%: CPT | Performed by: INTERNAL MEDICINE

## 2025-04-01 PROCEDURE — 99214 OFFICE O/P EST MOD 30 MIN: CPT | Performed by: INTERNAL MEDICINE

## 2025-04-01 PROCEDURE — 1159F MED LIST DOCD IN RCRD: CPT | Performed by: INTERNAL MEDICINE

## 2025-04-01 NOTE — PROGRESS NOTES
Kateryna took Lokelma 5 gram for 3 days. Repeat K was WNL.  Reports BSL \"good\" at home  She is trying to follow a low protein and potassium diet at home.   Denies swelling or SOB  She states she will see NP Garland soon to discuss the Rybelsus. She may have to switch to something else due to insurance coverage.   She denies being sick/dehydrated, denies diarrhea,no AB in past few months.   She had a cardiac stress test 2 weeks ago that she says was abnormal. She will see cardiology soon with [facility]. She missed the appointment recently.

## 2025-04-01 NOTE — PROGRESS NOTES
Renal Progress Note    Assessment and Plan:      Diagnosis Orders   1. CKD (chronic kidney disease) stage 5, GFR less than 15 ml/min ([facility])        2. Diabetic nephropathy associated with type 2 diabetes mellitus        3. Primary hypertension        4. Hyperkalemia        5. Hyperproteinemia        6. Vitamin D deficiency        7. Hyperparathyroidism, secondary renal                  PLAN:  Serum creatinine is increased to 3.86 mg/dL from 3.34 mg/dL.  This is most likely due to natural progression of underlying disease.  Will likely refer her for education and insight kidney disease option next appointment if no improvement in the kidney function.  Diabetes mellitus is managed by another provider.  Hypertension is reasonably controlled.  Hyperkalemia is resolved with treatment.  Low potassium diet information also provided to her.  Reluctant to do any workup for hypoproteinemia because of her age.  I doubt anything will be found in any case  Vitamin D level is good at 52.6.  Parathyroid hormone level is increased to 131.9 from 103.9 November 2024.  This still requires no specific treatment yet.  I discussed that with the patient.  Medications reviewed  No changes  Return visit in 3 months with labs          Patient Active Problem List   Diagnosis    Acute renal failure    CKD (chronic kidney disease), stage III (HCC)    HTN (hypertension)    Hypothyroidism    HLD (hyperlipidemia)    Anemia    DM type 2 (diabetes mellitus, type 2) (HCC)    Vitamin D deficiency    Type 2 diabetes mellitus with stage 3 chronic kidney disease (HCC)    CKD (chronic kidney disease), stage IV (HCC)           Subjective:   Chief complaint:  Chief Complaint   Patient presents with    Follow-up     FU 3 months for CKD IV      HPI:This is a follow up visit for Ms. Kateryna martini here today for return appointment.  I seen her for chronic kidney disease among other multiple entities.  She was last seen in December 2024.  She has not been

## 2025-04-02 ENCOUNTER — TELEPHONE (OUTPATIENT)
Dept: NEPHROLOGY | Age: 80
End: 2025-04-02

## 2025-07-16 DIAGNOSIS — E55.9 VITAMIN D DEFICIENCY: ICD-10-CM

## 2025-07-16 DIAGNOSIS — I10 PRIMARY HYPERTENSION: ICD-10-CM

## 2025-07-16 DIAGNOSIS — N18.5 CKD (CHRONIC KIDNEY DISEASE) STAGE 5, GFR LESS THAN 15 ML/MIN (HCC): Primary | ICD-10-CM

## 2025-07-16 DIAGNOSIS — E11.21 DIABETIC NEPHROPATHY ASSOCIATED WITH TYPE 2 DIABETES MELLITUS (HCC): ICD-10-CM

## 2025-07-16 DIAGNOSIS — E87.20 METABOLIC ACIDOSIS: ICD-10-CM

## 2025-07-16 DIAGNOSIS — N25.81 HYPERPARATHYROIDISM, SECONDARY RENAL: ICD-10-CM

## 2025-07-17 ENCOUNTER — RESULTS FOLLOW-UP (OUTPATIENT)
Dept: NEPHROLOGY | Age: 80
End: 2025-07-17

## 2025-07-17 DIAGNOSIS — N18.5 CKD (CHRONIC KIDNEY DISEASE) STAGE 5, GFR LESS THAN 15 ML/MIN (HCC): Primary | ICD-10-CM

## 2025-07-17 DIAGNOSIS — E87.5 HYPERKALEMIA: ICD-10-CM

## 2025-07-17 LAB
ANION GAP SERPL CALCULATED.3IONS-SCNC: 7 MMOL/L (ref 4–12)
BUN BLDV-MCNC: 56 MG/DL (ref 7–20)
CALCIUM SERPL-MCNC: 9.1 MG/DL (ref 8.8–10.5)
CHLORIDE BLD-SCNC: 106 MEQ/L (ref 101–111)
CO2: 21 MEQ/L (ref 21–32)
CREAT SERPL-MCNC: 3.77 MG/DL (ref 0.6–1.3)
CREATININE CLEARANCE: 14
CREATININE, RANDOM URINE: 96.2 MG/DL
GLUCOSE: 143 MG/DL (ref 70–110)
POTASSIUM SERPL-SCNC: 6 MEQ/L (ref 3.6–5)
PROTEIN, URINE, RANDOM: 258 MG/DL
PROTEIN/CREAT RATIO: 2.68 G/1.73M2
PTH INTACT: 182 PG/ML (ref 12–88)
SODIUM BLD-SCNC: 134 MEQ/L (ref 135–145)
VITAMIN D 25-HYDROXY: 38.3 NG/ML (ref 30–100)

## 2025-07-17 NOTE — TELEPHONE ENCOUNTER
[facility] lab phoned with a critical potassium 6.0 will fax over result

## 2025-07-17 NOTE — TELEPHONE ENCOUNTER
Spoke with Kateryna. She expresses understanding. She will stop in the office to  samples. She will stop multivitamin and hold losartan. She will monitor BP closely and bring to her follow up on 7/24/25. She was instructed to call with any significant changes in her BP. Placed samples, low K diet, repeat lab order at  for patient. MAR updated. Sample script pending.

## 2025-07-22 LAB — POTASSIUM SERPL-SCNC: 5.2 MEQ/L (ref 3.6–5)

## 2025-07-24 ENCOUNTER — OFFICE VISIT (OUTPATIENT)
Dept: NEPHROLOGY | Age: 80
End: 2025-07-24
Payer: MEDICARE

## 2025-07-24 VITALS
HEART RATE: 76 BPM | HEIGHT: 66 IN | BODY MASS INDEX: 22.82 KG/M2 | SYSTOLIC BLOOD PRESSURE: 122 MMHG | WEIGHT: 142 LBS | OXYGEN SATURATION: 97 % | DIASTOLIC BLOOD PRESSURE: 70 MMHG

## 2025-07-24 DIAGNOSIS — E87.5 HYPERKALEMIA: ICD-10-CM

## 2025-07-24 DIAGNOSIS — E87.1 HYPONATREMIA: ICD-10-CM

## 2025-07-24 DIAGNOSIS — E88.09 HYPERPROTEINEMIA: ICD-10-CM

## 2025-07-24 DIAGNOSIS — N18.5 CKD (CHRONIC KIDNEY DISEASE) STAGE 5, GFR LESS THAN 15 ML/MIN (HCC): Primary | ICD-10-CM

## 2025-07-24 DIAGNOSIS — R80.9 MICROALBUMINURIA: ICD-10-CM

## 2025-07-24 DIAGNOSIS — D47.2 MONOCLONAL GAMMOPATHY OF UNDETERMINED SIGNIFICANCE: ICD-10-CM

## 2025-07-24 DIAGNOSIS — R80.1 PERSISTENT PROTEINURIA: ICD-10-CM

## 2025-07-24 DIAGNOSIS — R80.9 PROTEINURIA, UNSPECIFIED TYPE: ICD-10-CM

## 2025-07-24 DIAGNOSIS — N25.81 HYPERPARATHYROIDISM, SECONDARY RENAL: ICD-10-CM

## 2025-07-24 PROCEDURE — 1159F MED LIST DOCD IN RCRD: CPT | Performed by: INTERNAL MEDICINE

## 2025-07-24 PROCEDURE — 99214 OFFICE O/P EST MOD 30 MIN: CPT | Performed by: INTERNAL MEDICINE

## 2025-07-24 PROCEDURE — 3078F DIAST BP <80 MM HG: CPT | Performed by: INTERNAL MEDICINE

## 2025-07-24 PROCEDURE — 3074F SYST BP LT 130 MM HG: CPT | Performed by: INTERNAL MEDICINE

## 2025-07-24 PROCEDURE — 1123F ACP DISCUSS/DSCN MKR DOCD: CPT | Performed by: INTERNAL MEDICINE

## 2025-07-24 RX ORDER — CALCITRIOL 0.25 UG/1
0.25 CAPSULE, LIQUID FILLED ORAL DAILY
Qty: 90 CAPSULE | Refills: 2 | Status: SHIPPED | OUTPATIENT
Start: 2025-07-24

## 2025-07-24 NOTE — PATIENT INSTRUCTIONS
Low Potassium Diet:       Foods are low in potassium. Plan to eat these every day. But don't eat more than 4 servings a day. A serving equals 1 small piece of fruit or ½ cup.   Fresh fruit: Apples, applesauce, grapes, and pineapple.   Canned fruit: Peaches and pears.   Vegetables: Green or wax beans, cabbage, lettuce, radishes, and green peas.    Foods are high in potassium. Don't eat more than 1 serving of these a day. A serving equals 1 small piece of fruit or ½ cup.   Fresh fruit: Peaches, pears, blackberries, grapefruit, boysenberries, strawberries, and cherries.   Vegetables: Asparagus, carrots, beets, corn, turnips, canned tomatoes, and zucchini.   Milk and yogurt. Don't eat more than 1 cup a day.    Foods are very high in potassium. Avoid these foods.   Fruits: Apricots, bananas, cantaloupe, dates, figs, honeydew, raisins, kiwi fruit, watermelon, nectarines, oranges, and orange juice.   Vegetables: Avocados, artichokes, brussels sprouts, potatoes, leafy   green vegetables (such as spinach), winter squash, fresh tomatoes, yams, and dried peas, beans, and lentils.   Clams, chocolate, nuts, molasses, and sardines.    Lower potassium in potatoes by \"leaching\". Boil the potatoes in water and then drain the liquid off. Repeat the rinse and drain twice.    Do not use    salt substitute or \"lite\" salt,    Taiwo Lite Salt   No Salt, Nu Salt.    These often are very high in potassium.     Mrs Dash is ok to use since it does not contain potassium      Potassium Content of Foods   (listing by high to low content)    Food  Serving Size Potassium (mg)   Baked potato (flesh only) one medium 610   Sweet potato, baked one medium 542   Banana, raw  one medium 422   Spinach, cooked ½ C 420   Jin beans, cooked ½ C 373   Kidney beans, cooked ½ C 371   Lentils, cooked ½ C 366   Navy beans, cooked ½ C 354   Plums, dried, pitted five 350   Artichokes, cooked one medium 343   Mashed potatoes ½ C 343   Edamame/soybeans, green ½ C

## 2025-07-24 NOTE — PROGRESS NOTES
Kateryna took Lokelma 5 g for 3 days and repeated K lab on 7/22/25. Dr. Oh wants her to take 5 g for next 2 days and repeat K lab 3-4 days.  She forgot her BP readings at home and says it has been running high.   Denies changes

## 2025-07-24 NOTE — PROGRESS NOTES
Renal Progress Note    Assessment and Plan:      Diagnosis Orders   1. CKD (chronic kidney disease) stage 5, GFR less than 15 ml/min ([facility])        2. Hyperkalemia        3. Hyponatremia        4. Proteinuria, unspecified type        5. Hyperproteinemia        6. Monoclonal gammopathy of undetermined significance        7. Microalbuminuria        8. Persistent proteinuria        9. Hyperparathyroidism, secondary renal                  PLAN:  Serum creatinine is very slightly improved to 3.77 mg/dL from 3.86 mg/L in March 2025.  Serum potassium level is improved to 5.2 mEq/L from 6.0 mEq/L.  She has been given a sodium zirconium cyclosilicate 5 g a day for another 2 days with repeat potassium level thereafter.  We also provided her with a low potassium diet information.  She admits to eating a lot of tomatoes and oranges.  Hyponatremia is very mild and deserves observation for now.  Urine protein creatinine ratio is increased to 2.68 g from 1.61 g in March 2025.  However it was 3.28 g in November 2024.  Serum protein is essentially unchanged at 8.1 mg/g.  This may suggest presence of monoclonal gammopathy.  However previous workup has been very unremarkable.  In addition, she should have such disease we should have seen a progression and worsening kidney function which we do not see.  Monitor for now, creatinine stable.  Microalbumin creatinine ratio is pending.  Persistent proteinuria is stable as well.  Parathyroid hormone level is increased to 182.0 from 131.9 in March 2025.  Will therefore start her on calcitriol 0.25 mcg once a day.  This was discussed with the patient.  The rationale also discussed with her.  She comprehended well.  Medications reviewed  No other changes  Return visit in 3 months with labs          Patient Active Problem List   Diagnosis    Acute renal failure    CKD (chronic kidney disease), stage III ([facility])    HTN (hypertension)    Hypothyroidism    HLD (hyperlipidemia)    Anemia    DM type 2

## 2025-07-28 LAB — POTASSIUM SERPL-SCNC: 5.1 MEQ/L (ref 3.6–5)

## 2025-07-30 DIAGNOSIS — E87.5 HYPERKALEMIA: Primary | ICD-10-CM

## 2025-08-01 DIAGNOSIS — E87.5 HYPERKALEMIA: Primary | ICD-10-CM

## 2025-08-08 LAB — POTASSIUM SERPL-SCNC: 4.3 MEQ/L (ref 3.6–5)

## 2025-08-11 ENCOUNTER — RESULTS FOLLOW-UP (OUTPATIENT)
Dept: NEPHROLOGY | Age: 80
End: 2025-08-11